# Patient Record
Sex: FEMALE | Race: WHITE | NOT HISPANIC OR LATINO | Employment: OTHER | URBAN - METROPOLITAN AREA
[De-identification: names, ages, dates, MRNs, and addresses within clinical notes are randomized per-mention and may not be internally consistent; named-entity substitution may affect disease eponyms.]

---

## 2021-01-01 ENCOUNTER — HOSPITAL ENCOUNTER (OUTPATIENT)
Dept: RADIOLOGY | Facility: MEDICAL CENTER | Age: 86
End: 2021-01-01
Payer: COMMERCIAL

## 2021-01-01 ENCOUNTER — APPOINTMENT (OUTPATIENT)
Dept: CARDIOLOGY | Facility: MEDICAL CENTER | Age: 86
DRG: 176 | End: 2021-01-01
Attending: EMERGENCY MEDICINE
Payer: COMMERCIAL

## 2021-01-01 ENCOUNTER — HOSPITAL ENCOUNTER (INPATIENT)
Facility: MEDICAL CENTER | Age: 86
LOS: 4 days | DRG: 176 | End: 2021-01-06
Attending: EMERGENCY MEDICINE | Admitting: INTERNAL MEDICINE
Payer: COMMERCIAL

## 2021-01-01 ENCOUNTER — APPOINTMENT (OUTPATIENT)
Dept: RADIOLOGY | Facility: MEDICAL CENTER | Age: 86
DRG: 176 | End: 2021-01-01
Attending: EMERGENCY MEDICINE
Payer: COMMERCIAL

## 2021-01-01 DIAGNOSIS — I26.99 ACUTE PULMONARY EMBOLISM WITHOUT ACUTE COR PULMONALE, UNSPECIFIED PULMONARY EMBOLISM TYPE (HCC): ICD-10-CM

## 2021-01-01 DIAGNOSIS — N18.30 STAGE 3 CHRONIC KIDNEY DISEASE, UNSPECIFIED WHETHER STAGE 3A OR 3B CKD: ICD-10-CM

## 2021-01-01 DIAGNOSIS — K83.1 OBSTRUCTIVE JAUNDICE: ICD-10-CM

## 2021-01-01 PROBLEM — F32.A DEPRESSION: Status: ACTIVE | Noted: 2021-01-01

## 2021-01-01 PROBLEM — I10 HYPERTENSION: Status: ACTIVE | Noted: 2021-01-01

## 2021-01-01 PROBLEM — R74.01 TRANSAMINITIS: Status: ACTIVE | Noted: 2021-01-01

## 2021-01-01 PROBLEM — I27.20 PULMONARY HYPERTENSION (HCC): Status: ACTIVE | Noted: 2021-01-01

## 2021-01-01 PROBLEM — E78.5 HYPERLIPIDEMIA: Status: ACTIVE | Noted: 2021-01-01

## 2021-01-01 PROBLEM — J96.01 ACUTE HYPOXEMIC RESPIRATORY FAILURE (HCC): Status: ACTIVE | Noted: 2021-01-01

## 2021-01-01 LAB
ALBUMIN SERPL BCP-MCNC: 3 G/DL (ref 3.2–4.9)
ALBUMIN/GLOB SERPL: 1 G/DL
ALP SERPL-CCNC: 266 U/L (ref 30–99)
ALT SERPL-CCNC: 202 U/L (ref 2–50)
ANION GAP SERPL CALC-SCNC: 9 MMOL/L (ref 7–16)
APPEARANCE UR: CLEAR
AST SERPL-CCNC: 367 U/L (ref 12–45)
BACTERIA #/AREA URNS HPF: ABNORMAL /HPF
BASOPHILS # BLD AUTO: 0.3 % (ref 0–1.8)
BASOPHILS # BLD: 0.05 K/UL (ref 0–0.12)
BILIRUB SERPL-MCNC: 3 MG/DL (ref 0.1–1.5)
BILIRUB UR QL STRIP.AUTO: NEGATIVE
BUN SERPL-MCNC: 23 MG/DL (ref 8–22)
CALCIUM SERPL-MCNC: 9 MG/DL (ref 8.5–10.5)
CHLORIDE SERPL-SCNC: 101 MMOL/L (ref 96–112)
CO2 SERPL-SCNC: 23 MMOL/L (ref 20–33)
COLOR UR: YELLOW
COVID ORDER STATUS COVID19: NORMAL
CREAT SERPL-MCNC: 1.24 MG/DL (ref 0.5–1.4)
EOSINOPHIL # BLD AUTO: 0.01 K/UL (ref 0–0.51)
EOSINOPHIL NFR BLD: 0.1 % (ref 0–6.9)
EPI CELLS #/AREA URNS HPF: ABNORMAL /HPF
ERYTHROCYTE [DISTWIDTH] IN BLOOD BY AUTOMATED COUNT: 49.6 FL (ref 35.9–50)
FLUAV RNA SPEC QL NAA+PROBE: NEGATIVE
FLUBV RNA SPEC QL NAA+PROBE: NEGATIVE
GLOBULIN SER CALC-MCNC: 2.9 G/DL (ref 1.9–3.5)
GLUCOSE SERPL-MCNC: 127 MG/DL (ref 65–99)
GLUCOSE UR STRIP.AUTO-MCNC: NEGATIVE MG/DL
HCT VFR BLD AUTO: 36.2 % (ref 37–47)
HGB BLD-MCNC: 12.1 G/DL (ref 12–16)
HYALINE CASTS #/AREA URNS LPF: ABNORMAL /LPF
IMM GRANULOCYTES # BLD AUTO: 0.09 K/UL (ref 0–0.11)
IMM GRANULOCYTES NFR BLD AUTO: 0.6 % (ref 0–0.9)
KETONES UR STRIP.AUTO-MCNC: NEGATIVE MG/DL
LEUKOCYTE ESTERASE UR QL STRIP.AUTO: ABNORMAL
LIPASE SERPL-CCNC: 16 U/L (ref 11–82)
LV EJECT FRACT  99904: 65
LV EJECT FRACT MOD 2C 99903: 76.01
LV EJECT FRACT MOD 4C 99902: 54.84
LV EJECT FRACT MOD BP 99901: 67.08
LYMPHOCYTES # BLD AUTO: 0.78 K/UL (ref 1–4.8)
LYMPHOCYTES NFR BLD: 4.8 % (ref 22–41)
MCH RBC QN AUTO: 31.6 PG (ref 27–33)
MCHC RBC AUTO-ENTMCNC: 33.4 G/DL (ref 33.6–35)
MCV RBC AUTO: 94.5 FL (ref 81.4–97.8)
MICRO URNS: ABNORMAL
MONOCYTES # BLD AUTO: 1.06 K/UL (ref 0–0.85)
MONOCYTES NFR BLD AUTO: 6.5 % (ref 0–13.4)
NEUTROPHILS # BLD AUTO: 14.22 K/UL (ref 2–7.15)
NEUTROPHILS NFR BLD: 87.7 % (ref 44–72)
NITRITE UR QL STRIP.AUTO: NEGATIVE
NRBC # BLD AUTO: 0 K/UL
NRBC BLD-RTO: 0 /100 WBC
PH UR STRIP.AUTO: 7 [PH] (ref 5–8)
PLATELET # BLD AUTO: 156 K/UL (ref 164–446)
PMV BLD AUTO: 10.2 FL (ref 9–12.9)
POTASSIUM SERPL-SCNC: 4.1 MMOL/L (ref 3.6–5.5)
PROT SERPL-MCNC: 5.9 G/DL (ref 6–8.2)
PROT UR QL STRIP: 30 MG/DL
RBC # BLD AUTO: 3.83 M/UL (ref 4.2–5.4)
RBC # URNS HPF: ABNORMAL /HPF
RBC UR QL AUTO: ABNORMAL
RSV RNA SPEC QL NAA+PROBE: NEGATIVE
SARS-COV-2 RNA RESP QL NAA+PROBE: NOTDETECTED
SODIUM SERPL-SCNC: 133 MMOL/L (ref 135–145)
SP GR UR STRIP.AUTO: 1.03
SPECIMEN SOURCE: NORMAL
TROPONIN T SERPL-MCNC: 47 NG/L (ref 6–19)
UROBILINOGEN UR STRIP.AUTO-MCNC: 0.2 MG/DL
WBC # BLD AUTO: 16.2 K/UL (ref 4.8–10.8)
WBC #/AREA URNS HPF: ABNORMAL /HPF

## 2021-01-01 PROCEDURE — 93306 TTE W/DOPPLER COMPLETE: CPT | Mod: 26 | Performed by: INTERNAL MEDICINE

## 2021-01-01 PROCEDURE — 85025 COMPLETE CBC W/AUTO DIFF WBC: CPT

## 2021-01-01 PROCEDURE — 36415 COLL VENOUS BLD VENIPUNCTURE: CPT

## 2021-01-01 PROCEDURE — G0378 HOSPITAL OBSERVATION PER HR: HCPCS

## 2021-01-01 PROCEDURE — 99285 EMERGENCY DEPT VISIT HI MDM: CPT

## 2021-01-01 PROCEDURE — 0241U HCHG SARS-COV-2 COVID-19 NFCT DS RESP RNA 4 TRGT MIC: CPT

## 2021-01-01 PROCEDURE — 99218 PR INITIAL OBSERVATION CARE,LEVL I: CPT | Mod: AI | Performed by: HOSPITALIST

## 2021-01-01 PROCEDURE — 80053 COMPREHEN METABOLIC PANEL: CPT

## 2021-01-01 PROCEDURE — 83690 ASSAY OF LIPASE: CPT

## 2021-01-01 PROCEDURE — 76705 ECHO EXAM OF ABDOMEN: CPT

## 2021-01-01 PROCEDURE — 81001 URINALYSIS AUTO W/SCOPE: CPT

## 2021-01-01 PROCEDURE — C9803 HOPD COVID-19 SPEC COLLECT: HCPCS | Performed by: EMERGENCY MEDICINE

## 2021-01-01 PROCEDURE — 87086 URINE CULTURE/COLONY COUNT: CPT

## 2021-01-01 PROCEDURE — 700102 HCHG RX REV CODE 250 W/ 637 OVERRIDE(OP): Performed by: HOSPITALIST

## 2021-01-01 PROCEDURE — 93306 TTE W/DOPPLER COMPLETE: CPT

## 2021-01-01 PROCEDURE — A9270 NON-COVERED ITEM OR SERVICE: HCPCS | Performed by: HOSPITALIST

## 2021-01-01 PROCEDURE — 84484 ASSAY OF TROPONIN QUANT: CPT

## 2021-01-01 RX ORDER — FERROUS SULFATE 325(65) MG
325 TABLET ORAL EVERY MORNING
Status: DISCONTINUED | OUTPATIENT
Start: 2021-01-02 | End: 2021-01-06 | Stop reason: HOSPADM

## 2021-01-01 RX ORDER — LOSARTAN POTASSIUM 50 MG/1
50 TABLET ORAL EVERY EVENING
Status: DISCONTINUED | OUTPATIENT
Start: 2021-01-01 | End: 2021-01-06 | Stop reason: HOSPADM

## 2021-01-01 RX ORDER — FLUOXETINE HYDROCHLORIDE 20 MG/1
20 CAPSULE ORAL EVERY MORNING
Status: DISCONTINUED | OUTPATIENT
Start: 2021-01-01 | End: 2021-01-06 | Stop reason: HOSPADM

## 2021-01-01 RX ORDER — FLUTICASONE PROPIONATE 50 MCG
2 SPRAY, SUSPENSION (ML) NASAL EVERY EVENING
Status: DISCONTINUED | OUTPATIENT
Start: 2021-01-01 | End: 2021-01-06 | Stop reason: HOSPADM

## 2021-01-01 RX ORDER — ONDANSETRON 2 MG/ML
4 INJECTION INTRAMUSCULAR; INTRAVENOUS EVERY 4 HOURS PRN
Status: DISCONTINUED | OUTPATIENT
Start: 2021-01-01 | End: 2021-01-06 | Stop reason: HOSPADM

## 2021-01-01 RX ORDER — ACETAMINOPHEN 325 MG/1
650 TABLET ORAL EVERY 6 HOURS PRN
Status: DISCONTINUED | OUTPATIENT
Start: 2021-01-01 | End: 2021-01-06 | Stop reason: HOSPADM

## 2021-01-01 RX ORDER — LOSARTAN POTASSIUM 50 MG/1
50 TABLET ORAL EVERY EVENING
COMMUNITY

## 2021-01-01 RX ORDER — FERROUS SULFATE 325(65) MG
325 TABLET ORAL EVERY MORNING
COMMUNITY

## 2021-01-01 RX ORDER — POLYETHYLENE GLYCOL 3350 17 G/17G
1 POWDER, FOR SOLUTION ORAL
Status: DISCONTINUED | OUTPATIENT
Start: 2021-01-01 | End: 2021-01-06 | Stop reason: HOSPADM

## 2021-01-01 RX ORDER — ONDANSETRON 4 MG/1
4 TABLET, ORALLY DISINTEGRATING ORAL EVERY 4 HOURS PRN
Status: DISCONTINUED | OUTPATIENT
Start: 2021-01-01 | End: 2021-01-06 | Stop reason: HOSPADM

## 2021-01-01 RX ORDER — AMOXICILLIN 250 MG
2 CAPSULE ORAL 2 TIMES DAILY
Status: DISCONTINUED | OUTPATIENT
Start: 2021-01-01 | End: 2021-01-06 | Stop reason: HOSPADM

## 2021-01-01 RX ORDER — FLUTICASONE PROPIONATE 50 MCG
2 SPRAY, SUSPENSION (ML) NASAL EVERY EVENING
COMMUNITY

## 2021-01-01 RX ORDER — ROSUVASTATIN CALCIUM 20 MG/1
20 TABLET, COATED ORAL EVERY EVENING
Status: DISCONTINUED | OUTPATIENT
Start: 2021-01-01 | End: 2021-01-01

## 2021-01-01 RX ORDER — BISACODYL 10 MG
10 SUPPOSITORY, RECTAL RECTAL
Status: DISCONTINUED | OUTPATIENT
Start: 2021-01-01 | End: 2021-01-06 | Stop reason: HOSPADM

## 2021-01-01 RX ORDER — CLINDAMYCIN PHOSPHATE 10 UG/ML
1 LOTION TOPICAL 2 TIMES DAILY
COMMUNITY

## 2021-01-01 RX ORDER — FLUOXETINE HYDROCHLORIDE 20 MG/1
20 CAPSULE ORAL EVERY MORNING
COMMUNITY

## 2021-01-01 RX ORDER — ACETAMINOPHEN 500 MG
1000 TABLET ORAL
COMMUNITY

## 2021-01-01 RX ORDER — M-VIT,TX,IRON,MINS/CALC/FOLIC 27MG-0.4MG
1 TABLET ORAL EVERY MORNING
Status: DISCONTINUED | OUTPATIENT
Start: 2021-01-02 | End: 2021-01-06 | Stop reason: HOSPADM

## 2021-01-01 RX ORDER — ROSUVASTATIN CALCIUM 20 MG/1
20 TABLET, COATED ORAL EVERY EVENING
COMMUNITY

## 2021-01-01 RX ADMIN — FLUOXETINE 20 MG: 20 CAPSULE ORAL at 18:41

## 2021-01-01 RX ADMIN — FLUTICASONE PROPIONATE 100 MCG: 50 SPRAY, METERED NASAL at 21:30

## 2021-01-01 RX ADMIN — LOSARTAN POTASSIUM 50 MG: 50 TABLET, FILM COATED ORAL at 18:41

## 2021-01-01 ASSESSMENT — ENCOUNTER SYMPTOMS
HEADACHES: 0
BLOOD IN STOOL: 0
LOSS OF CONSCIOUSNESS: 0
PALPITATIONS: 0
COUGH: 0
PSYCHIATRIC NEGATIVE: 1
CHILLS: 0
NERVOUS/ANXIOUS: 0
FOCAL WEAKNESS: 0
NAUSEA: 0
DIZZINESS: 0
FEVER: 0
FALLS: 1
SHORTNESS OF BREATH: 1
SENSORY CHANGE: 0
VOMITING: 0
WEAKNESS: 1
ABDOMINAL PAIN: 0
SPEECH CHANGE: 0
INSOMNIA: 0

## 2021-01-01 ASSESSMENT — PAIN DESCRIPTION - PAIN TYPE: TYPE: ACUTE PAIN

## 2021-01-01 NOTE — ED NOTES
Assumed pt care, report received. Pt resting on gurney in NAD. Daughter at bedside. Pt denies pain, respirations even and unlabored on 1L NC. Mild dyspnea with exertion. Updated to POC, needs met.

## 2021-01-01 NOTE — ED TRIAGE NOTES
BIB EMS to rm 2, tx from [facility]  Chief Complaint   Patient presents with   • Shortness of Breath     Pt was satting in the mid 80s PTA on RA, placed on 2L and is now 96%. Pt denies any CP or SOB, VSS.

## 2021-01-01 NOTE — ED PROVIDER NOTES
ED Provider Note    CHIEF COMPLAINT  Chief Complaint   Patient presents with   • Shortness of Breath       HPI  Jen Ramos is a 89 y.o. female who presents as a transfer for several active issues.  The patient apparently lives in [facility].  Reports she had a may be had a fall and broke some ribs few weeks ago.  Family checked on her noted that she was more confused than usual.  She does have a history of dementia but is otherwise quite healthy.  At [facility] they performed an extensive work-up.  CT scan of the head was negative.  CT scan of the chest and abdomen demonstrated gallstones and apparently bilateral pulmonary emboli.  She also had leukocytosis with possible UTI.  Blood cultures were performed and she was given IV Zosyn and first dose of Lovenox.  The patient does report some shortness of breath.  No report of fevers or chills    REVIEW OF SYSTEMS  See HPI for further details.  No night sweats weight loss numbness tingling weakness rash all other systems are negative.     PAST MEDICAL HISTORY  Past Medical History:   Diagnosis Date   • High cholesterol    • Hypertension        FAMILY HISTORY  Noncontributory    SOCIAL HISTORY  Social History     Socioeconomic History   • Marital status: Not on file     Spouse name: Not on file   • Number of children: Not on file   • Years of education: Not on file   • Highest education level: Not on file   Occupational History   • Not on file   Social Needs   • Financial resource strain: Not on file   • Food insecurity     Worry: Not on file     Inability: Not on file   • Transportation needs     Medical: Not on file     Non-medical: Not on file   Tobacco Use   • Smoking status: Never Smoker   • Smokeless tobacco: Never Used   Substance and Sexual Activity   • Alcohol use: Yes     Comment: occasionally   • Drug use: Never   • Sexual activity: Not on file   Lifestyle   • Physical activity     Days per week: Not on file     Minutes per  "session: Not on file   • Stress: Not on file   Relationships   • Social connections     Talks on phone: Not on file     Gets together: Not on file     Attends Mandaeism service: Not on file     Active member of club or organization: Not on file     Attends meetings of clubs or organizations: Not on file     Relationship status: Not on file   • Intimate partner violence     Fear of current or ex partner: Not on file     Emotionally abused: Not on file     Physically abused: Not on file     Forced sexual activity: Not on file   Other Topics Concern   • Not on file   Social History Narrative   • Not on file       SURGICAL HISTORY  No past surgical history on file.    CURRENT MEDICATIONS  Home Medications    **Home medications have not yet been reviewed for this encounter**         ALLERGIES  No Known Allergies    PHYSICAL EXAM  VITAL SIGNS: /66   Pulse 66   Temp 36.7 °C (98 °F) (Temporal)   Resp 20   Ht 1.651 m (5' 5\")   Wt 61.2 kg (135 lb)   SpO2 98%   BMI 22.47 kg/m²       Constitutional: Elderly ill-appearing  HENT: Normocephalic, Atraumatic, Bilateral external ears normal, Oropharynx moist, No oral exudates, Nose normal.   Eyes: PERRLA, EOMI, Conjunctiva normal, No discharge.   Neck: Normal range of motion, No tenderness, Supple, No stridor.   Cardiovascular: Normal heart rate, Normal rhythm, No murmurs, No rubs, No gallops.   Thorax & Lungs: Normal breath sounds, No respiratory distress, No wheezing, No chest tenderness.   Abdomen: Bowel sounds normal, Soft, minimal epigastric discomfort no rebound or guarding  Skin: Warm, Dry, No erythema, No rash.   Back: No tenderness, No CVA tenderness.   Extremities: Intact distal pulses, No edema, No tenderness, No cyanosis, No clubbing.   Neurologic: Alert & oriented x 3, Normal motor function, Normal sensory function, No focal deficits noted.   Psychiatric: Anxious.     RADIOLOGY/PROCEDURES  CT scan of the head from outside facility is unremarkable  CT scan of " the chest abdomen pelvis with contrast demonstrates large asymmetric pulmonary emboli with prominence of the right ventricle suggesting right heart strain.  Interstitial scarring without pulmonary infarct.  No pneumothorax.  US-RUQ   Final Result         1. Cholelithiasis. No evidence of cholecystitis.   2. Mildly dilated CBD for patient's age. If there is clinical concern for choledocholithiasis, consider MRCP.         EC-ECHOCARDIOGRAM COMPLETE W/O CONT   Final Result      OUTSIDE IMAGES-CT CHEST/ABDOMEN/PELVIS   Final Result      OUTSIDE IMAGES-CT HEAD   Final Result        Results for orders placed or performed during the hospital encounter of 01/01/21   EC-ECHOCARDIOGRAM COMPLETE W/O CONT   Result Value Ref Range    Eject.Frac. MOD BP 67.08     Eject.Frac. MOD 4C 54.84     Eject.Frac. MOD 2C 76.01     Left Ventrical Ejection Fraction 65    CBC WITH DIFFERENTIAL   Result Value Ref Range    WBC 16.2 (H) 4.8 - 10.8 K/uL    RBC 3.83 (L) 4.20 - 5.40 M/uL    Hemoglobin 12.1 12.0 - 16.0 g/dL    Hematocrit 36.2 (L) 37.0 - 47.0 %    MCV 94.5 81.4 - 97.8 fL    MCH 31.6 27.0 - 33.0 pg    MCHC 33.4 (L) 33.6 - 35.0 g/dL    RDW 49.6 35.9 - 50.0 fL    Platelet Count 156 (L) 164 - 446 K/uL    MPV 10.2 9.0 - 12.9 fL    Neutrophils-Polys 87.70 (H) 44.00 - 72.00 %    Lymphocytes 4.80 (L) 22.00 - 41.00 %    Monocytes 6.50 0.00 - 13.40 %    Eosinophils 0.10 0.00 - 6.90 %    Basophils 0.30 0.00 - 1.80 %    Immature Granulocytes 0.60 0.00 - 0.90 %    Nucleated RBC 0.00 /100 WBC    Neutrophils (Absolute) 14.22 (H) 2.00 - 7.15 K/uL    Lymphs (Absolute) 0.78 (L) 1.00 - 4.80 K/uL    Monos (Absolute) 1.06 (H) 0.00 - 0.85 K/uL    Eos (Absolute) 0.01 0.00 - 0.51 K/uL    Baso (Absolute) 0.05 0.00 - 0.12 K/uL    Immature Granulocytes (abs) 0.09 0.00 - 0.11 K/uL    NRBC (Absolute) 0.00 K/uL   Comp Metabolic Panel   Result Value Ref Range    Sodium 133 (L) 135 - 145 mmol/L    Potassium 4.1 3.6 - 5.5 mmol/L    Chloride 101 96 - 112 mmol/L    Co2  23 20 - 33 mmol/L    Anion Gap 9.0 7.0 - 16.0    Glucose 127 (H) 65 - 99 mg/dL    Bun 23 (H) 8 - 22 mg/dL    Creatinine 1.24 0.50 - 1.40 mg/dL    Calcium 9.0 8.5 - 10.5 mg/dL    AST(SGOT) 367 (H) 12 - 45 U/L    ALT(SGPT) 202 (H) 2 - 50 U/L    Alkaline Phosphatase 266 (H) 30 - 99 U/L    Total Bilirubin 3.0 (H) 0.1 - 1.5 mg/dL    Albumin 3.0 (L) 3.2 - 4.9 g/dL    Total Protein 5.9 (L) 6.0 - 8.2 g/dL    Globulin 2.9 1.9 - 3.5 g/dL    A-G Ratio 1.0 g/dL   LIPASE   Result Value Ref Range    Lipase 16 11 - 82 U/L   TROPONIN   Result Value Ref Range    Troponin T 47 (H) 6 - 19 ng/L   COVID/SARS CoV-2 PCR    Specimen: Nasopharyngeal; Respirate   Result Value Ref Range    COVID Order Status Received    ESTIMATED GFR   Result Value Ref Range    GFR If  49 (A) >60 mL/min/1.73 m 2    GFR If Non African American 40 (A) >60 mL/min/1.73 m 2   CoV-2, Flu A/B, And RSV by PCR   Result Value Ref Range    Influenza virus A RNA Negative Negative    Influenza virus B, PCR Negative Negative    RSV, PCR Negative Negative    SARS-CoV-2 by PCR NotDetected     SARS-CoV-2 Source NP Swab       COURSE & MEDICAL DECISION MAKING  Pertinent Labs & Imaging studies reviewed. (See chart for details)  Patient presents here as a transfer for multiple issues including bilateral pulmonary emboli.  She has no tachycardia or hypotension to suggest need for thrombolytics.  There is suggestion of right heart strain on CT scan therefore echocardiogram has been performed.  Admitting hospitalist will follow up on this.  She already received blood cultures, first dose of IV Zosyn as well as first dose of Lovenox.  It is unlikely that she is a candidate for directed thrombolysis.  Patient appears clinically well given her age and active issues.  She will be admitted to internal medicine    FINAL IMPRESSION  1.  Bilateral pulmonary emboli  2.  Pneumonitis with possible choledocholithiasis    Admission         Electronically signed by: Pankaj AVILES  YU Minor, 1/1/2021 1:59 PM

## 2021-01-02 ENCOUNTER — APPOINTMENT (OUTPATIENT)
Dept: RADIOLOGY | Facility: MEDICAL CENTER | Age: 86
DRG: 176 | End: 2021-01-02
Attending: HOSPITALIST
Payer: COMMERCIAL

## 2021-01-02 PROBLEM — N30.00 ACUTE CYSTITIS WITHOUT HEMATURIA: Status: ACTIVE | Noted: 2021-01-02

## 2021-01-02 PROBLEM — K83.1 OBSTRUCTIVE JAUNDICE: Status: ACTIVE | Noted: 2021-01-01

## 2021-01-02 LAB
ALBUMIN SERPL BCP-MCNC: 2.9 G/DL (ref 3.2–4.9)
ALBUMIN/GLOB SERPL: 1 G/DL
ALP SERPL-CCNC: 307 U/L (ref 30–99)
ALT SERPL-CCNC: 189 U/L (ref 2–50)
ANION GAP SERPL CALC-SCNC: 9 MMOL/L (ref 7–16)
APTT PPP: 40.4 SEC (ref 24.7–36)
AST SERPL-CCNC: 259 U/L (ref 12–45)
BASOPHILS # BLD AUTO: 0.3 % (ref 0–1.8)
BASOPHILS # BLD: 0.03 K/UL (ref 0–0.12)
BILIRUB SERPL-MCNC: 3.4 MG/DL (ref 0.1–1.5)
BUN SERPL-MCNC: 24 MG/DL (ref 8–22)
CALCIUM SERPL-MCNC: 8.8 MG/DL (ref 8.5–10.5)
CHLORIDE SERPL-SCNC: 102 MMOL/L (ref 96–112)
CO2 SERPL-SCNC: 24 MMOL/L (ref 20–33)
CREAT SERPL-MCNC: 1.16 MG/DL (ref 0.5–1.4)
EOSINOPHIL # BLD AUTO: 0.14 K/UL (ref 0–0.51)
EOSINOPHIL NFR BLD: 1.4 % (ref 0–6.9)
ERYTHROCYTE [DISTWIDTH] IN BLOOD BY AUTOMATED COUNT: 49.4 FL (ref 35.9–50)
GLOBULIN SER CALC-MCNC: 3 G/DL (ref 1.9–3.5)
GLUCOSE SERPL-MCNC: 109 MG/DL (ref 65–99)
HCT VFR BLD AUTO: 34.2 % (ref 37–47)
HGB BLD-MCNC: 11.2 G/DL (ref 12–16)
IMM GRANULOCYTES # BLD AUTO: 0.05 K/UL (ref 0–0.11)
IMM GRANULOCYTES NFR BLD AUTO: 0.5 % (ref 0–0.9)
INR PPP: 1.16 (ref 0.87–1.13)
LYMPHOCYTES # BLD AUTO: 0.92 K/UL (ref 1–4.8)
LYMPHOCYTES NFR BLD: 9.1 % (ref 22–41)
MCH RBC QN AUTO: 31.2 PG (ref 27–33)
MCHC RBC AUTO-ENTMCNC: 32.7 G/DL (ref 33.6–35)
MCV RBC AUTO: 95.3 FL (ref 81.4–97.8)
MONOCYTES # BLD AUTO: 1.11 K/UL (ref 0–0.85)
MONOCYTES NFR BLD AUTO: 11 % (ref 0–13.4)
NEUTROPHILS # BLD AUTO: 7.87 K/UL (ref 2–7.15)
NEUTROPHILS NFR BLD: 77.7 % (ref 44–72)
NRBC # BLD AUTO: 0 K/UL
NRBC BLD-RTO: 0 /100 WBC
PLATELET # BLD AUTO: 143 K/UL (ref 164–446)
PMV BLD AUTO: 10.3 FL (ref 9–12.9)
POTASSIUM SERPL-SCNC: 3.7 MMOL/L (ref 3.6–5.5)
PROT SERPL-MCNC: 5.9 G/DL (ref 6–8.2)
PROTHROMBIN TIME: 15.2 SEC (ref 12–14.6)
RBC # BLD AUTO: 3.59 M/UL (ref 4.2–5.4)
SODIUM SERPL-SCNC: 135 MMOL/L (ref 135–145)
UFH PPP CHRO-ACNC: 0.1 IU/ML
WBC # BLD AUTO: 10.1 K/UL (ref 4.8–10.8)

## 2021-01-02 PROCEDURE — 700105 HCHG RX REV CODE 258: Performed by: INTERNAL MEDICINE

## 2021-01-02 PROCEDURE — 85520 HEPARIN ASSAY: CPT

## 2021-01-02 PROCEDURE — 85025 COMPLETE CBC W/AUTO DIFF WBC: CPT

## 2021-01-02 PROCEDURE — 85730 THROMBOPLASTIN TIME PARTIAL: CPT

## 2021-01-02 PROCEDURE — A9270 NON-COVERED ITEM OR SERVICE: HCPCS | Performed by: HOSPITALIST

## 2021-01-02 PROCEDURE — 74181 MRI ABDOMEN W/O CONTRAST: CPT

## 2021-01-02 PROCEDURE — 770006 HCHG ROOM/CARE - MED/SURG/GYN SEMI*

## 2021-01-02 PROCEDURE — 700102 HCHG RX REV CODE 250 W/ 637 OVERRIDE(OP): Performed by: HOSPITALIST

## 2021-01-02 PROCEDURE — 700111 HCHG RX REV CODE 636 W/ 250 OVERRIDE (IP): Performed by: INTERNAL MEDICINE

## 2021-01-02 PROCEDURE — 93970 EXTREMITY STUDY: CPT

## 2021-01-02 PROCEDURE — 700111 HCHG RX REV CODE 636 W/ 250 OVERRIDE (IP): Performed by: HOSPITALIST

## 2021-01-02 PROCEDURE — 96365 THER/PROPH/DIAG IV INF INIT: CPT

## 2021-01-02 PROCEDURE — 80053 COMPREHEN METABOLIC PANEL: CPT

## 2021-01-02 PROCEDURE — 85610 PROTHROMBIN TIME: CPT

## 2021-01-02 PROCEDURE — 96372 THER/PROPH/DIAG INJ SC/IM: CPT

## 2021-01-02 PROCEDURE — 36415 COLL VENOUS BLD VENIPUNCTURE: CPT

## 2021-01-02 PROCEDURE — 99233 SBSQ HOSP IP/OBS HIGH 50: CPT | Performed by: INTERNAL MEDICINE

## 2021-01-02 PROCEDURE — 94760 N-INVAS EAR/PLS OXIMETRY 1: CPT

## 2021-01-02 RX ORDER — HEPARIN SODIUM 1000 [USP'U]/ML
40 INJECTION, SOLUTION INTRAVENOUS; SUBCUTANEOUS PRN
Status: DISPENSED | OUTPATIENT
Start: 2021-01-02 | End: 2021-01-05

## 2021-01-02 RX ORDER — HEPARIN SODIUM 5000 [USP'U]/100ML
0-30 INJECTION, SOLUTION INTRAVENOUS CONTINUOUS
Status: DISPENSED | OUTPATIENT
Start: 2021-01-02 | End: 2021-01-05

## 2021-01-02 RX ADMIN — HEPARIN SODIUM 18 UNITS/KG/HR: 5000 INJECTION, SOLUTION INTRAVENOUS at 18:04

## 2021-01-02 RX ADMIN — FLUOXETINE 20 MG: 20 CAPSULE ORAL at 05:40

## 2021-01-02 RX ADMIN — MULTIPLE VITAMINS W/ MINERALS TAB 1 TABLET: TAB at 05:40

## 2021-01-02 RX ADMIN — FERROUS SULFATE TAB 325 MG (65 MG ELEMENTAL FE) 325 MG: 325 (65 FE) TAB at 05:40

## 2021-01-02 RX ADMIN — FLUTICASONE PROPIONATE 100 MCG: 50 SPRAY, METERED NASAL at 17:41

## 2021-01-02 RX ADMIN — CEFTRIAXONE SODIUM 1 G: 1 INJECTION, POWDER, FOR SOLUTION INTRAMUSCULAR; INTRAVENOUS at 11:14

## 2021-01-02 RX ADMIN — ENOXAPARIN SODIUM 60 MG: 60 INJECTION SUBCUTANEOUS at 11:15

## 2021-01-02 RX ADMIN — DOCUSATE SODIUM 50 MG AND SENNOSIDES 8.6 MG 2 TABLET: 8.6; 5 TABLET, FILM COATED ORAL at 05:40

## 2021-01-02 RX ADMIN — LOSARTAN POTASSIUM 50 MG: 50 TABLET, FILM COATED ORAL at 17:41

## 2021-01-02 ASSESSMENT — ENCOUNTER SYMPTOMS
COUGH: 0
SHORTNESS OF BREATH: 0
DIARRHEA: 0
FEVER: 0
SPUTUM PRODUCTION: 0
HEMOPTYSIS: 0
WHEEZING: 0
BLOOD IN STOOL: 0
ABDOMINAL PAIN: 0
CHILLS: 0

## 2021-01-02 ASSESSMENT — COGNITIVE AND FUNCTIONAL STATUS - GENERAL
CLIMB 3 TO 5 STEPS WITH RAILING: A LITTLE
WALKING IN HOSPITAL ROOM: A LITTLE
SUGGESTED CMS G CODE MODIFIER MOBILITY: CK
HELP NEEDED FOR BATHING: A LITTLE
WALKING IN HOSPITAL ROOM: A LITTLE
SUGGESTED CMS G CODE MODIFIER DAILY ACTIVITY: CK
MOVING FROM LYING ON BACK TO SITTING ON SIDE OF FLAT BED: A LITTLE
EATING MEALS: A LITTLE
TOILETING: A LITTLE
SUGGESTED CMS G CODE MODIFIER DAILY ACTIVITY: CJ
DRESSING REGULAR LOWER BODY CLOTHING: A LITTLE
MOVING FROM LYING ON BACK TO SITTING ON SIDE OF FLAT BED: A LITTLE
SUGGESTED CMS G CODE MODIFIER MOBILITY: CK
MOVING TO AND FROM BED TO CHAIR: A LITTLE
DRESSING REGULAR UPPER BODY CLOTHING: A LITTLE
MOBILITY SCORE: 18
CLIMB 3 TO 5 STEPS WITH RAILING: A LITTLE
STANDING UP FROM CHAIR USING ARMS: A LITTLE
TURNING FROM BACK TO SIDE WHILE IN FLAT BAD: A LITTLE
HELP NEEDED FOR BATHING: A LITTLE
PERSONAL GROOMING: A LITTLE
MOBILITY SCORE: 19
MOVING TO AND FROM BED TO CHAIR: A LITTLE
DAILY ACTIVITIY SCORE: 18
DAILY ACTIVITIY SCORE: 20
DRESSING REGULAR LOWER BODY CLOTHING: A LITTLE
STANDING UP FROM CHAIR USING ARMS: A LITTLE
TOILETING: A LITTLE
DRESSING REGULAR UPPER BODY CLOTHING: A LITTLE

## 2021-01-02 ASSESSMENT — LIFESTYLE VARIABLES
CONSUMPTION TOTAL: INCOMPLETE
ALCOHOL_USE: YES
HAVE PEOPLE ANNOYED YOU BY CRITICIZING YOUR DRINKING: NO
TOTAL SCORE: 0
TOTAL SCORE: 0
HAVE YOU EVER FELT YOU SHOULD CUT DOWN ON YOUR DRINKING: NO
EVER HAD A DRINK FIRST THING IN THE MORNING TO STEADY YOUR NERVES TO GET RID OF A HANGOVER: NO
HAVE YOU EVER FELT YOU SHOULD CUT DOWN ON YOUR DRINKING: NO
DOES PATIENT WANT TO STOP DRINKING: NO
TOTAL SCORE: 0
EVER HAD A DRINK FIRST THING IN THE MORNING TO STEADY YOUR NERVES TO GET RID OF A HANGOVER: NO
EVER HAD A DRINK FIRST THING IN THE MORNING TO STEADY YOUR NERVES TO GET RID OF A HANGOVER: NO
EVER FELT BAD OR GUILTY ABOUT YOUR DRINKING: NO
EVER FELT BAD OR GUILTY ABOUT YOUR DRINKING: NO
CONSUMPTION TOTAL: INCOMPLETE
AVERAGE NUMBER OF DAYS PER WEEK YOU HAVE A DRINK CONTAINING ALCOHOL: -2
AVERAGE NUMBER OF DAYS PER WEEK YOU HAVE A DRINK CONTAINING ALCOHOL: 2
ALCOHOL_USE: YES
HOW MANY TIMES IN THE PAST YEAR HAVE YOU HAD 5 OR MORE DRINKS IN A DAY: 0
ON A TYPICAL DAY WHEN YOU DRINK ALCOHOL HOW MANY DRINKS DO YOU HAVE: 1
HAVE PEOPLE ANNOYED YOU BY CRITICIZING YOUR DRINKING: NO
EVER FELT BAD OR GUILTY ABOUT YOUR DRINKING: NO
HAVE PEOPLE ANNOYED YOU BY CRITICIZING YOUR DRINKING: NO
AVERAGE NUMBER OF DAYS PER WEEK YOU HAVE A DRINK CONTAINING ALCOHOL: -2
TOTAL SCORE: 0
CONSUMPTION TOTAL: NEGATIVE
TOTAL SCORE: 0
AVERAGE NUMBER OF DAYS PER WEEK YOU HAVE A DRINK CONTAINING ALCOHOL: -2
TOTAL SCORE: 0
ALCOHOL_USE: YES
TOTAL SCORE: 0
TOTAL SCORE: 0
EVER FELT BAD OR GUILTY ABOUT YOUR DRINKING: NO
DOES PATIENT WANT TO STOP DRINKING: NO
TOTAL SCORE: 0
ON A TYPICAL DAY WHEN YOU DRINK ALCOHOL HOW MANY DRINKS DO YOU HAVE: 1
TOTAL SCORE: 0
ON A TYPICAL DAY WHEN YOU DRINK ALCOHOL HOW MANY DRINKS DO YOU HAVE: 1
CONSUMPTION TOTAL: INCOMPLETE
ON A TYPICAL DAY WHEN YOU DRINK ALCOHOL HOW MANY DRINKS DO YOU HAVE: -1
HAVE PEOPLE ANNOYED YOU BY CRITICIZING YOUR DRINKING: NO
EVER HAD A DRINK FIRST THING IN THE MORNING TO STEADY YOUR NERVES TO GET RID OF A HANGOVER: NO
TOTAL SCORE: 0
EVER HAD A DRINK FIRST THING IN THE MORNING TO STEADY YOUR NERVES TO GET RID OF A HANGOVER: NO
HAVE YOU EVER FELT YOU SHOULD CUT DOWN ON YOUR DRINKING: NO
HAVE YOU EVER FELT YOU SHOULD CUT DOWN ON YOUR DRINKING: NO
DOES PATIENT WANT TO STOP DRINKING: NO
ON A TYPICAL DAY WHEN YOU DRINK ALCOHOL HOW MANY DRINKS DO YOU HAVE: -1
HAVE YOU EVER FELT YOU SHOULD CUT DOWN ON YOUR DRINKING: NO
EVER FELT BAD OR GUILTY ABOUT YOUR DRINKING: NO
ALCOHOL_USE: YES
AVERAGE NUMBER OF DAYS PER WEEK YOU HAVE A DRINK CONTAINING ALCOHOL: 3
TOTAL SCORE: 0
CONSUMPTION TOTAL: INCOMPLETE
TOTAL SCORE: 0
ALCOHOL_USE: YES
HAVE PEOPLE ANNOYED YOU BY CRITICIZING YOUR DRINKING: NO
DOES PATIENT WANT TO STOP DRINKING: NO

## 2021-01-02 ASSESSMENT — PATIENT HEALTH QUESTIONNAIRE - PHQ9
SUM OF ALL RESPONSES TO PHQ9 QUESTIONS 1 AND 2: 0
2. FEELING DOWN, DEPRESSED, IRRITABLE, OR HOPELESS: NOT AT ALL
1. LITTLE INTEREST OR PLEASURE IN DOING THINGS: NOT AT ALL

## 2021-01-02 ASSESSMENT — FIBROSIS 4 INDEX: FIB4 SCORE: 12.38

## 2021-01-02 NOTE — ASSESSMENT & PLAN NOTE
Echo with grade I diastolic dysfunction and elevated RVSP at 66mmHg. Increased pressures 2/2 PE.  - management as above  - outpatient follow up

## 2021-01-02 NOTE — PROGRESS NOTES
2 RN skin check complete with Lin HALE.   Devices in place PIV.  Skin assessed under devices yes.  Confirmed pressure ulcers found on n/a.  New potential pressure ulcers noted on no. Wound consult placed no.    The following interventions in place pt is able to turn self and encouraged to do so. Pressure redistribution mattress.     Skin Assessment:  Bilateral ears: Red/pink blanching, grey foam pad applied  Elbows, heels, and Sacrum: Intact/ pink/ blanching.

## 2021-01-02 NOTE — PROGRESS NOTES
HOSPITAL MEDICINE PROGRESS NOTE    Date of service  1/2/2021    Chief Complaint  Shortness of Breath      Hospital Course:     89-year-old woman who presented on transfer from an outside hospital for acute PE.           Interval History Updates:  No event overnight.  Afebrile.    I discussed the history with her daughter over the phone.  Patient has a fall on October 24, 2020 and subsequently was not as active as he used to be.  She was visiting her daughter near [place] for the holiday and suffered another fall surrounding the after [place].,  Most likely this represents an event where a DVT clot travel to her lung causing a PE.  Subsequently, the patient become more lethargic, more hypoxic and ultimately ended up in the ER at [facility] in [place] due to hypoxia and shortness of breath, was diagnosed with an acute PE at that time before transferring here for higher level of care.    I anticipate that the patient will need to remain hospitalized for at least an additional 2 to 3 days for additional work-up and treatment of her DVT/PE as well as obstructive jaundice.  Therefore, patient admitted to inpatient status.    Consultants/Specialty  None    Review of Systems   Review of Systems   Constitutional: Negative for chills and fever.   Respiratory: Negative for cough, hemoptysis, sputum production, shortness of breath and wheezing.    Cardiovascular: Negative for chest pain.   Gastrointestinal: Negative for abdominal pain, blood in stool and diarrhea.   Skin: Negative for itching and rash.        Medications:  • cefTRIAXone (ROCEPHIN) IV  1 g Q24HRS   • heparin  0-30 Units/kg/hr Continuous   • heparin  40 Units/kg PRN   • senna-docusate  2 Tab BID    And   • polyethylene glycol/lytes  1 Packet QDAY PRN    And   • magnesium hydroxide  30 mL QDAY PRN    And   • bisacodyl  10 mg QDAY PRN   • Respiratory Therapy Consult   Continuous RT   • acetaminophen  650 mg Q6HRS PRN   • ondansetron  4 mg Q4HRS PRN   • ondansetron   4 mg Q4HRS PRN   • ferrous sulfate  325 mg QAM   • FLUoxetine  20 mg QAM   • fluticasone  2 Spray Q EVENING   • therapeutic multivitamin-minerals  1 Tab QAM   • losartan  50 mg Q EVENING           Physical Exam   Vitals:    01/01/21 1823 01/01/21 1930 01/02/21 0400 01/02/21 0750   BP: 129/67 112/60 119/73 129/59   Pulse: 68 71 75 80   Resp: 20 20 18 16   Temp: 35.8 °C (96.5 °F) 36 °C (96.8 °F) 36.4 °C (97.6 °F) 36.7 °C (98.1 °F)   TempSrc: Temporal Tympanic Temporal Temporal   SpO2:  97% 97% 95%   Weight:       Height:           Physical Exam   Constitutional: She is oriented to person, place, and time and well-developed, well-nourished, and in no distress. No distress.   HENT:   Head: Normocephalic and atraumatic.   Eyes: Scleral icterus is present.   Cardiovascular: Normal rate and regular rhythm. Exam reveals no gallop.   Pulmonary/Chest: Effort normal and breath sounds normal.   Abdominal: Soft. Bowel sounds are normal. She exhibits distension.   Positive for mild tenderness at the right upper quadrant but no rebound or guarding.   Neurological: She is alert and oriented to person, place, and time.   Skin: Skin is warm and dry. She is not diaphoretic.   Psychiatric: Mood and affect normal.   Vitals reviewed.         Laboratory   Recent Labs     01/01/21  1423 01/02/21  0152   WBC 16.2* 10.1   RBC 3.83* 3.59*   HEMOGLOBIN 12.1 11.2*   HEMATOCRIT 36.2* 34.2*   PLATELETCT 156* 143*     Recent Labs     01/01/21  1423 01/02/21  0152   SODIUM 133* 135   POTASSIUM 4.1 3.7   CHLORIDE 101 102   CO2 23 24   GLUCOSE 127* 109*   BUN 23* 24*   CREATININE 1.24 1.16      Recent Labs     01/01/21  1423 01/02/21  0152   ALTSGPT 202* 189*   ASTSGOT 367* 259*   ALKPHOSPHAT 266* 307*   TBILIRUBIN 3.0* 3.4*   LIPASE 16  --    GLUCOSE 127* 109*                Microbiology  Results     Procedure Component Value Units Date/Time    URINALYSIS [[id number]]  (Abnormal) Collected: 01/01/21 8295    Order Status: Completed Specimen: Urine  "Updated: 01/01/21 1914     Color Yellow     Character Clear     Specific Gravity 1.027     Ph 7.0     Glucose Negative mg/dL      Ketones Negative mg/dL      Protein 30 mg/dL      Bilirubin Negative     Urobilinogen, Urine 0.2     Nitrite Negative     Leukocyte Esterase Moderate     Occult Blood Moderate     Micro Urine Req Microscopic    Narrative:      Droplet, Contact, and Eye Protection    CoV-2, Flu A/B, And RSV by PCR [[id number]] Collected: 01/01/21 1453    Order Status: Completed Updated: 01/01/21 1556     Influenza virus A RNA Negative     Influenza virus B, PCR Negative     RSV, PCR Negative     SARS-CoV-2 by PCR NotDetected     Comment: PATIENTS: Important information regarding your results and instructions can  be found at [email or web address]   \"After your  Covid-19 Test\"  [facility] providers: PLEASE REFER TO DE-ESCALATION AND RETESTING PROTOCOL  on [email or web address]  **The [employer] GeneXpert Xpress SARS-CoV-2 Test has been made available for  use under the Emergency Use Authorization (EUA) only.          SARS-CoV-2 Source NP Swab    Narrative:      Droplet, Contact, and Eye Protection  Rule-out COVID-19 panel, includes droplet/contact/eye  isolation order.  Check influenza to order this test only if  specifically indicated.  Is patient being admitted?->Yes  Does this patient meet criteria for Rush/Cepheid per [facility]  Inpatient Workflow? (See workflow link below)->Yes  Expected turn around time?->Rush (Cepheid 2-4 hours)  Have you been in close contact with a person who is suspected  or known to be positive for COVID-19 within the last 30 days  (e.g. last seen that person < 30 days ago)->Unknown    COVID/SARS CoV-2 PCR [[id number]] Collected: 01/01/21 1453    Order Status: Completed Specimen: Respirate from Nasopharyngeal Updated: 01/01/21 1508     COVID Order Status Received     Comment: The order for SARS CoV-2 testing has been received by the  Laboratory. This result is neither " positive nor negative.  Final results of testing will report in 24-48 hours, separately.         Narrative:      Droplet, Contact, and Eye Protection  Rule-out COVID-19 panel, includes droplet/contact/eye  isolation order.  Check influenza to order this test only if  specifically indicated.  Is patient being admitted?->Yes  Does this patient meet criteria for Rush/Cepheid per [facility]  Inpatient Workflow? (See workflow link below)->Yes  Expected turn around time?->Rush (Cepheid 2-4 hours)  Have you been in close contact with a person who is suspected  or known to be positive for COVID-19 within the last 30 days  (e.g. last seen that person < 30 days ago)->Unknown             Labs reviewed by me and noted above.    Radiology  US-EXTREMITY VENOUS LOWER BILAT   Vascular Laboratory   CONCLUSIONS     CARTER PATRICK     Exam Date:     2021 08:39     Room #:     Inpatient     Priority:     Routine     Ht (in):             Wt (lb):     Ordering Physician:        FORTUNATO SÁNCHEZ     Referring Physician:       [id number] Serrano     Sonographer:               Branden Deras RVT     Study Type:                Complete Bilateral     Technical Quality:         Adequate     Age:    89    Gender:     F     MRN:    [id number]     :    [id number]      BSA:     Indications:     Pulmonary Embolus, Shortness of breath     CPT Codes:       16710     ICD Codes:       415.19  R06.02     History:         shortness of breath; no prior exams     Limitations:     PROCEDURES:   Venous duplex imaging was performed in both lower extremities including    serial compressions and spectral Doppler flow evaluation.     FINDINGS:   RIGHT:   Incompressible common femoral vein & proximal femoral vein (1 of 2) with    mixed echogenic material occluding the lumen consistent with subacute deep    vein thrombosis.  Flow in the profunda femoral vein is reversed.     Continuous flow is observed in the 2nd patent femoral vein.   Partial incompressibility of  the popliteal & a peroneal vein in the calf    with chronic appearing strand of thrombus partially filling the lumen in    the popliteal; the peroneal vein thrombus may be subacute.   The posterior tibial veins are fully compressible.   LEFT:   Chronic residua of prior thrombus observed in the popliteal vein which is    non-occlusive.   All other veins appear fully compressible where visualized with normal    venous flow demonstrated.   No evidence of acute deep vein thrombosis.      No results found for this or any previous visit.       Assessment and Plan      * Acute pulmonary embolism (HCC)- (present on admission)  Assessment & Plan  With hypoxia which is expected.  However, patient remained hemodynamically stable without any subjective chest pain and only required 1 L of supplemental oxygen via nasal cannula.  By history, most likely the second ground-level fall event post [place] is probably when her DVT (right lower extremity ultrasound preliminary show subacute DVT of the right femoral vein) traveled to her lungs because of this PE.  The ultrasound of her lower extremity show a subacute clot in the right lower extremity.  Given her renal function, we will switch her from Lovenox to heparin drip.  Discussed with the inpatient pharmacist.  We will aim to start the heparin drip and 6 hours after the last administration of Lovenox subcutaneous dose.    Obstructive jaundice- (present on admission)  Assessment & Plan  She is jaundiced.  Right upper quadrant ultrasound positive for cholelithiasis.  Mildly tender right upper quadrant abdominal exam.  - MRCP pending  Ceftriaxone started for urinary tract infection will give some coverage for this particular issue as well.  More than likely, she will need cholecystectomy during this admission.  It would be nice if her clot burden in her pulmonary vasculature is resolved.    Acute cystitis without hematuria- (present on admission)  Assessment & Plan  Positive  urinalysis.  We will send existing urine for culture.  Empiric treatment with ceftriaxone.    Hypertension- (present on admission)  Assessment & Plan  Normotensive.   - continue home losartan    Depression- (present on admission)  Assessment & Plan  Stable at this time.   - continue home fluoxetine    Pulmonary hypertension (HCC)- (present on admission)  Assessment & Plan  Echo with grade I diastolic dysfunction and elevated RVSP at 66mmHg. Increased pressures 2/2 PE.  - management as above  - outpatient follow up    Stage 3 chronic kidney disease- (present on admission)  Assessment & Plan  At baseline.   - avoid nephrotoxic agents  - renally dose medications    Hyperlipidemia  Assessment & Plan  - holding rosuvastatin 2/2 transaminitis      DVT prophylaxis: Lovenox    CODE STATUS:  FULL CODE    Disposition: TBD.

## 2021-01-02 NOTE — PROGRESS NOTES
Assumed care of pt at shift change. Pt is on 1L of oxygen via NC with no signs of acute distress. A&Ox4. Denies any pain. POC discussed with patient. All comfort measures in place. Call light and personal belongings by bedside. Bed locked and in lowest position. Hourly rounding in place.

## 2021-01-02 NOTE — ASSESSMENT & PLAN NOTE
With hypoxia which is expected.  However, patient remained hemodynamically stable without any subjective chest pain and only required 1 L of supplemental oxygen via nasal cannula.  By history, most likely the second ground-level fall event post [place] is probably when her DVT in her legs traveled to her lungs to cause this PE.  The US of her lower extremity show a subacute and chronic clots in her legs.  Patient has been on heparin gtt due to renal function.  After discussion with social work and family, they have decided to start Eliquis and they will be able to afford. Patient will start Eliquis tonight, stop heparin gtt once Eliquis given.

## 2021-01-02 NOTE — ASSESSMENT & PLAN NOTE
She is jaundiced.  Right upper quadrant ultrasound positive for cholelithiasis.  MRCP without obstructive stone.  LFTs and bilirubin trending down.  Appreciate surgery consult, patient will need follow up and elective laparoscopic cholecystectomy in 1-2 months after stabilization of PE. Discussed with family as she will be travelling back to home in [place], [place].   Anesthesia Volume In Cc (Will Not Render If 0): 0.5

## 2021-01-02 NOTE — ED NOTES
Med Rec completed per patient, patients family and home medication list  Allergies reviewed  No ORAL antibiotics in last 14 days

## 2021-01-02 NOTE — H&P
Hospital Medicine History & Physical Note    Date of Service  1/1/2021    Primary Care Physician  No primary care provider on file.    Consultants  None    Code Status  Full Code    Chief Complaint  Chief Complaint   Patient presents with   • Shortness of Breath       History of Presenting Illness  89 y.o. female who presented 1/1/2021 with shortness of breath, increased weakness. She presented to an outside facility and was found to have bilateral PE with possible right heart strain. She reported that he had just recently came here from [place] on Dec. 22 and had a fall on Dec 26. Since that time she has been progressively more tired, weak and with decreased appetite. When she developed SOB and labored breathing, the family called EMS.     She was given therapeutic lovenox for her PE and a dose of zosyn for her elevated liver enzymes and possible cholecystitis, before transferring here for higher level of care.     In the ED, Echo and RUQ US were ordered. WBC 16.2, AST/ALT and alk phos elevated in the 200-300's, T bili 3. Trop T 47. She is saturating well on 2L NC. Per patient, her labored breathing resolved after she was started on supplemental O2.     Review of Systems  Review of Systems   Constitutional: Positive for malaise/fatigue. Negative for chills and fever.   Respiratory: Positive for shortness of breath. Negative for cough.    Cardiovascular: Negative for chest pain (from fractured ribs), palpitations and leg swelling.   Gastrointestinal: Negative for abdominal pain, blood in stool, nausea and vomiting.   Genitourinary: Negative for dysuria.   Musculoskeletal: Positive for falls.   Neurological: Positive for weakness. Negative for dizziness, sensory change, speech change, focal weakness, loss of consciousness and headaches.   Psychiatric/Behavioral: Negative.  The patient is not nervous/anxious and does not have insomnia.    All other systems reviewed and are negative.      Past Medical History   has a  past medical history of High cholesterol and Hypertension.    Surgical History   has no past surgical history on file.     Family History  family history is not on file.     Social History   reports that she has never smoked. She has never used smokeless tobacco. She reports current alcohol use. She reports that she does not use drugs.  No tobacco. Social alcohol. No illicit drug use.     Allergies  No Known Allergies    Medications  None       Physical Exam  Temp:  [36.7 °C (98 °F)] 36.7 °C (98 °F)  Pulse:  [66-75] 66  Resp:  [17-20] 20  BP: (125-149)/(62-71) 132/66  SpO2:  [98 %-99 %] 98 %    Physical Exam  Vitals signs reviewed.   Constitutional:       General: She is not in acute distress.     Appearance: She is not ill-appearing or diaphoretic.      Interventions: Nasal cannula in place.      Comments: Elderly but appears younger than stated age   HENT:      Head: Normocephalic.      Nose: No congestion.   Eyes:      General: No scleral icterus.     Extraocular Movements: Extraocular movements intact.      Pupils: Pupils are equal, round, and reactive to light.   Neck:      Musculoskeletal: Normal range of motion. No neck rigidity.   Cardiovascular:      Rate and Rhythm: Normal rate and regular rhythm.      Pulses: Normal pulses.      Heart sounds: No murmur.   Pulmonary:      Effort: Pulmonary effort is normal. No respiratory distress.      Breath sounds: No wheezing or rales.   Abdominal:      General: There is no distension.      Palpations: Abdomen is soft.      Tenderness: There is no abdominal tenderness. There is no guarding or rebound.   Musculoskeletal:         General: No tenderness.      Right lower leg: No edema.      Left lower leg: No edema.   Skin:     General: Skin is warm and dry.      Coloration: Skin is not jaundiced.   Neurological:      General: No focal deficit present.      Mental Status: She is alert and oriented to person, place, and time. Mental status is at baseline.   Psychiatric:          Mood and Affect: Mood normal.         Speech: Speech normal.         Behavior: Behavior normal. Behavior is cooperative.         Thought Content: Thought content normal.         Laboratory:  Recent Labs     01/01/21  1423   WBC 16.2*   RBC 3.83*   HEMOGLOBIN 12.1   HEMATOCRIT 36.2*   MCV 94.5   MCH 31.6   MCHC 33.4*   RDW 49.6   PLATELETCT 156*   MPV 10.2     Recent Labs     01/01/21  1423   SODIUM 133*   POTASSIUM 4.1   CHLORIDE 101   CO2 23   GLUCOSE 127*   BUN 23*   CREATININE 1.24   CALCIUM 9.0     Recent Labs     01/01/21  1423   ALTSGPT 202*   ASTSGOT 367*   ALKPHOSPHAT 266*   TBILIRUBIN 3.0*   LIPASE 16   GLUCOSE 127*         No results for input(s): NTPROBNP in the last 72 hours.      Recent Labs     01/01/21  1423   TROPONINT 47*       Imaging:  US-RUQ   Final Result         1. Cholelithiasis. No evidence of cholecystitis.   2. Mildly dilated CBD for patient's age. If there is clinical concern for choledocholithiasis, consider MRCP.         EC-ECHOCARDIOGRAM COMPLETE W/O CONT   Final Result      OUTSIDE IMAGES-CT CHEST/ABDOMEN/PELVIS   Final Result      OUTSIDE IMAGES-CT HEAD   Final Result      [id number]    (Results Pending)   US-EXTREMITY VENOUS LOWER BILAT    (Results Pending)         Assessment/Plan:  I anticipate this patient is appropriate for observation status at this time.    * Acute pulmonary embolism (HCC)- (present on admission)  Assessment & Plan  2/2 decreased mobilization from her recent fall in Oct, resulting in rib fractures? Complicated by hypoxia but hemodynamically stable.   - therapeutic lovenox  - will need pre auth before starting on oral anticoagulation  - bilateral LE venous US pending    Acute hypoxemic respiratory failure (HCC)- (present on admission)  Assessment & Plan  2/2 acute PE. Covid negative.   - RT protocol  - O2 supplementation as needed; not on home O2 at baseline  - management as above  - IS use with education    Hypertension- (present on  admission)  Assessment & Plan  Normotensive.   - continue home losartan    Depression- (present on admission)  Assessment & Plan  Stable at this time.   - continue home fluoxetine    Pulmonary hypertension (HCC)- (present on admission)  Assessment & Plan  Echo with grade I diastolic dysfunction and elevated RVSP at 66mmHg. Increased pressures 2/2 PE.  - management as above  - outpatient follow up    Stage 3 chronic kidney disease- (present on admission)  Assessment & Plan  At baseline.   - avoid nephrotoxic agents  - renally dose medications    Transaminitis- (present on admission)  Assessment & Plan  , , Alk phos 266, T bili 3 on admission. Examination is benign but will evaluate for choledocholithiasis.   - repeat tomorrow  - MRCP pending  - no indication for abx at this time but will monitor clinically    RUQ US  1. Cholelithiasis. No evidence of cholecystitis.  2. Mildly dilated CBD for patient's age. If there is clinical concern for choledocholithiasis, consider MRCP.    Hyperlipidemia  Assessment & Plan  - holding rosuvastatin 2/2 transaminitis

## 2021-01-02 NOTE — ASSESSMENT & PLAN NOTE
2/2 acute PE. Covid negative.   - RT protocol  - O2 supplementation as needed; not on home O2 at baseline  - management as above  - IS use with education

## 2021-01-02 NOTE — CARE PLAN
Problem: Communication  Goal: The ability to communicate needs accurately and effectively will improve  Outcome: PROGRESSING AS EXPECTED  Note: Encouraged pt to express any concerns she might have.     Problem: Safety  Goal: Will remain free from injury  Outcome: PROGRESSING AS EXPECTED  Note: Fall precautions in place. Bed in lowest position. Non-skid socks in place. Personal possessions within reach. Mobility sign on door. Bed-alarm on. Call light within reach. Pt educated regarding fall prevention and states understanding.

## 2021-01-02 NOTE — PROGRESS NOTES
Pt arrived to unit via wheelchair with transport at 1815. Pt oriented to room, unit, and plan of care. On 1L of oxygen Via NC. Denies any pain. All questions answered at this time. Call light within reach, and fall precautions in place.

## 2021-01-02 NOTE — PROGRESS NOTES
Bedside report received.  Pt is A&O X4, denies any pain at this time.  She is on 1L O2, nasal cannula, resting comfortably in bed.  Pt is able to stand and pivot to bedside commode with assistance   POC discussed with pt; all questions answered at this time.

## 2021-01-03 LAB
ALBUMIN SERPL BCP-MCNC: 3 G/DL (ref 3.2–4.9)
ALBUMIN/GLOB SERPL: 1 G/DL
ALP SERPL-CCNC: 532 U/L (ref 30–99)
ALT SERPL-CCNC: 161 U/L (ref 2–50)
ANION GAP SERPL CALC-SCNC: 11 MMOL/L (ref 7–16)
AST SERPL-CCNC: 188 U/L (ref 12–45)
BASOPHILS # BLD AUTO: 0.2 % (ref 0–1.8)
BASOPHILS # BLD: 0.02 K/UL (ref 0–0.12)
BILIRUB CONJ SERPL-MCNC: 2 MG/DL (ref 0.1–0.5)
BILIRUB SERPL-MCNC: 3.3 MG/DL (ref 0.1–1.5)
BUN SERPL-MCNC: 20 MG/DL (ref 8–22)
CALCIUM SERPL-MCNC: 8.7 MG/DL (ref 8.5–10.5)
CHLORIDE SERPL-SCNC: 102 MMOL/L (ref 96–112)
CO2 SERPL-SCNC: 24 MMOL/L (ref 20–33)
CREAT SERPL-MCNC: 0.95 MG/DL (ref 0.5–1.4)
EOSINOPHIL # BLD AUTO: 0.23 K/UL (ref 0–0.51)
EOSINOPHIL NFR BLD: 2.8 % (ref 0–6.9)
ERYTHROCYTE [DISTWIDTH] IN BLOOD BY AUTOMATED COUNT: 49 FL (ref 35.9–50)
GLOBULIN SER CALC-MCNC: 3.1 G/DL (ref 1.9–3.5)
GLUCOSE SERPL-MCNC: 102 MG/DL (ref 65–99)
HCT VFR BLD AUTO: 35.8 % (ref 37–47)
HGB BLD-MCNC: 11.8 G/DL (ref 12–16)
IMM GRANULOCYTES # BLD AUTO: 0.03 K/UL (ref 0–0.11)
IMM GRANULOCYTES NFR BLD AUTO: 0.4 % (ref 0–0.9)
LYMPHOCYTES # BLD AUTO: 1.17 K/UL (ref 1–4.8)
LYMPHOCYTES NFR BLD: 14.2 % (ref 22–41)
MCH RBC QN AUTO: 30.7 PG (ref 27–33)
MCHC RBC AUTO-ENTMCNC: 33 G/DL (ref 33.6–35)
MCV RBC AUTO: 93.2 FL (ref 81.4–97.8)
MONOCYTES # BLD AUTO: 0.88 K/UL (ref 0–0.85)
MONOCYTES NFR BLD AUTO: 10.7 % (ref 0–13.4)
NEUTROPHILS # BLD AUTO: 5.93 K/UL (ref 2–7.15)
NEUTROPHILS NFR BLD: 71.7 % (ref 44–72)
NRBC # BLD AUTO: 0 K/UL
NRBC BLD-RTO: 0 /100 WBC
PLATELET # BLD AUTO: 151 K/UL (ref 164–446)
PMV BLD AUTO: 10.7 FL (ref 9–12.9)
POTASSIUM SERPL-SCNC: 3.5 MMOL/L (ref 3.6–5.5)
PROT SERPL-MCNC: 6.1 G/DL (ref 6–8.2)
RBC # BLD AUTO: 3.84 M/UL (ref 4.2–5.4)
SODIUM SERPL-SCNC: 137 MMOL/L (ref 135–145)
UFH PPP CHRO-ACNC: 0.52 IU/ML
UFH PPP CHRO-ACNC: 0.6 IU/ML
UFH PPP CHRO-ACNC: 0.74 IU/ML
UFH PPP CHRO-ACNC: 1.04 IU/ML
WBC # BLD AUTO: 8.3 K/UL (ref 4.8–10.8)

## 2021-01-03 PROCEDURE — 85025 COMPLETE CBC W/AUTO DIFF WBC: CPT

## 2021-01-03 PROCEDURE — 99233 SBSQ HOSP IP/OBS HIGH 50: CPT | Performed by: INTERNAL MEDICINE

## 2021-01-03 PROCEDURE — 700105 HCHG RX REV CODE 258: Performed by: INTERNAL MEDICINE

## 2021-01-03 PROCEDURE — A9270 NON-COVERED ITEM OR SERVICE: HCPCS | Performed by: HOSPITALIST

## 2021-01-03 PROCEDURE — 82248 BILIRUBIN DIRECT: CPT

## 2021-01-03 PROCEDURE — 97162 PT EVAL MOD COMPLEX 30 MIN: CPT

## 2021-01-03 PROCEDURE — 700102 HCHG RX REV CODE 250 W/ 637 OVERRIDE(OP): Performed by: HOSPITALIST

## 2021-01-03 PROCEDURE — 770006 HCHG ROOM/CARE - MED/SURG/GYN SEMI*

## 2021-01-03 PROCEDURE — 36415 COLL VENOUS BLD VENIPUNCTURE: CPT

## 2021-01-03 PROCEDURE — 700111 HCHG RX REV CODE 636 W/ 250 OVERRIDE (IP): Performed by: INTERNAL MEDICINE

## 2021-01-03 PROCEDURE — 85520 HEPARIN ASSAY: CPT

## 2021-01-03 PROCEDURE — A9270 NON-COVERED ITEM OR SERVICE: HCPCS | Performed by: INTERNAL MEDICINE

## 2021-01-03 PROCEDURE — 700102 HCHG RX REV CODE 250 W/ 637 OVERRIDE(OP): Performed by: INTERNAL MEDICINE

## 2021-01-03 PROCEDURE — 80053 COMPREHEN METABOLIC PANEL: CPT

## 2021-01-03 RX ORDER — POTASSIUM CHLORIDE 20 MEQ/1
40 TABLET, EXTENDED RELEASE ORAL ONCE
Status: COMPLETED | OUTPATIENT
Start: 2021-01-03 | End: 2021-01-03

## 2021-01-03 RX ADMIN — HEPARIN SODIUM 13 UNITS/KG/HR: 5000 INJECTION, SOLUTION INTRAVENOUS at 19:57

## 2021-01-03 RX ADMIN — DOCUSATE SODIUM 50 MG AND SENNOSIDES 8.6 MG 2 TABLET: 8.6; 5 TABLET, FILM COATED ORAL at 04:18

## 2021-01-03 RX ADMIN — MULTIPLE VITAMINS W/ MINERALS TAB 1 TABLET: TAB at 04:19

## 2021-01-03 RX ADMIN — CEFTRIAXONE SODIUM 1 G: 1 INJECTION, POWDER, FOR SOLUTION INTRAMUSCULAR; INTRAVENOUS at 04:19

## 2021-01-03 RX ADMIN — HEPARIN SODIUM 15 UNITS/KG/HR: 5000 INJECTION, SOLUTION INTRAVENOUS at 02:56

## 2021-01-03 RX ADMIN — POTASSIUM CHLORIDE 40 MEQ: 1500 TABLET, EXTENDED RELEASE ORAL at 13:54

## 2021-01-03 RX ADMIN — HEPARIN SODIUM 13 UNITS/KG/HR: 5000 INJECTION, SOLUTION INTRAVENOUS at 09:13

## 2021-01-03 RX ADMIN — HEPARIN SODIUM 15 UNITS/KG/HR: 5000 INJECTION, SOLUTION INTRAVENOUS at 07:39

## 2021-01-03 RX ADMIN — ACETAMINOPHEN 650 MG: 325 TABLET, FILM COATED ORAL at 07:52

## 2021-01-03 RX ADMIN — FERROUS SULFATE TAB 325 MG (65 MG ELEMENTAL FE) 325 MG: 325 (65 FE) TAB at 04:19

## 2021-01-03 RX ADMIN — FLUOXETINE 20 MG: 20 CAPSULE ORAL at 04:19

## 2021-01-03 RX ADMIN — LOSARTAN POTASSIUM 50 MG: 50 TABLET, FILM COATED ORAL at 17:41

## 2021-01-03 RX ADMIN — ACETAMINOPHEN 650 MG: 325 TABLET, FILM COATED ORAL at 17:41

## 2021-01-03 ASSESSMENT — ENCOUNTER SYMPTOMS
DIARRHEA: 0
FEVER: 0
SHORTNESS OF BREATH: 0
ABDOMINAL PAIN: 0
WHEEZING: 0
CHILLS: 0
HEMOPTYSIS: 0
BLOOD IN STOOL: 0
COUGH: 0
SPUTUM PRODUCTION: 0

## 2021-01-03 ASSESSMENT — COGNITIVE AND FUNCTIONAL STATUS - GENERAL
STANDING UP FROM CHAIR USING ARMS: A LITTLE
WALKING IN HOSPITAL ROOM: A LITTLE
CLIMB 3 TO 5 STEPS WITH RAILING: A LITTLE
MOVING FROM LYING ON BACK TO SITTING ON SIDE OF FLAT BED: A LITTLE
SUGGESTED CMS G CODE MODIFIER MOBILITY: CK
MOBILITY SCORE: 18
TURNING FROM BACK TO SIDE WHILE IN FLAT BAD: A LITTLE
MOVING TO AND FROM BED TO CHAIR: A LITTLE

## 2021-01-03 ASSESSMENT — GAIT ASSESSMENTS: GAIT LEVEL OF ASSIST: UNABLE TO PARTICIPATE

## 2021-01-03 ASSESSMENT — PAIN DESCRIPTION - PAIN TYPE: TYPE: ACUTE PAIN

## 2021-01-03 NOTE — PROGRESS NOTES
HOSPITAL MEDICINE PROGRESS NOTE    Date of service  1/3/2021    Chief Complaint  Shortness of Breath      Hospital Course:     89-year-old woman who presented on transfer from an outside hospital for acute PE.          Interval History Updates:  No event overnight.  Afebrile.    I discussed the history with her daughter over the phone.  Patient has a fall on October 24, 2020 and subsequently was not as active as he used to be.  She was visiting her daughter near [place] for the holiday and suffered another fall surrounding the after [place].,  Most likely this represents an event where a DVT clot travel to her lung causing a PE.  Subsequently, the patient become more lethargic, more hypoxic and ultimately ended up in the ER at [facility] in [place] due to hypoxia and shortness of breath, was diagnosed with an acute PE at that time before transferring here for higher level of care.    Consultants/Specialty  None    Review of Systems   Review of Systems   Constitutional: Negative for chills and fever.   Respiratory: Negative for cough, hemoptysis, sputum production, shortness of breath and wheezing.    Cardiovascular: Negative for chest pain.   Gastrointestinal: Negative for abdominal pain, blood in stool and diarrhea.   Skin: Negative for itching and rash.        Medications:  • cefTRIAXone (ROCEPHIN) IV  1 g Q24HRS   • heparin  0-30 Units/kg/hr Continuous   • heparin  40 Units/kg PRN   • senna-docusate  2 Tab BID    And   • polyethylene glycol/lytes  1 Packet QDAY PRN    And   • magnesium hydroxide  30 mL QDAY PRN    And   • bisacodyl  10 mg QDAY PRN   • Respiratory Therapy Consult   Continuous RT   • acetaminophen  650 mg Q6HRS PRN   • ondansetron  4 mg Q4HRS PRN   • ondansetron  4 mg Q4HRS PRN   • ferrous sulfate  325 mg QAM   • FLUoxetine  20 mg QAM   • fluticasone  2 Spray Q EVENING   • therapeutic multivitamin-minerals  1 Tab QAM   • losartan  50 mg Q EVENING           Physical Exam   Vitals:    01/02/21 1918  01/02/21 2100 01/03/21 0330 01/03/21 0750   BP: 151/72  153/75 152/72   Pulse: 80  79 71   Resp: 18  18 18   Temp: 36.2 °C (97.2 °F)  36.4 °C (97.6 °F) 36.7 °C (98.1 °F)   TempSrc: Temporal  Temporal Axillary   SpO2: 96%  95% 99%   Weight:  61.2 kg (135 lb)     Height:           Physical Exam   Constitutional: She is oriented to person, place, and time and well-developed, well-nourished, and in no distress. No distress.   HENT:   Head: Normocephalic and atraumatic.   Eyes: Scleral icterus is present.   Cardiovascular: Normal rate and regular rhythm. Exam reveals no gallop.   Pulmonary/Chest: Effort normal and breath sounds normal.   Abdominal: Soft. Bowel sounds are normal. She exhibits distension.   Positive for mild tenderness at the right upper quadrant but no rebound or guarding.   Neurological: She is alert and oriented to person, place, and time.   Skin: Skin is warm and dry. She is not diaphoretic.   Psychiatric: Mood and affect normal.   Vitals reviewed.         Laboratory   Recent Labs     01/01/21  1423 01/02/21  0152 01/03/21  0020   WBC 16.2* 10.1 8.3   RBC 3.83* 3.59* 3.84*   HEMOGLOBIN 12.1 11.2* 11.8*   HEMATOCRIT 36.2* 34.2* 35.8*   PLATELETCT 156* 143* 151*     Recent Labs     01/01/21  1423 01/02/21  0152 01/03/21  0020   SODIUM 133* 135 137   POTASSIUM 4.1 3.7 3.5*   CHLORIDE 101 102 102   CO2 23 24 24   GLUCOSE 127* 109* 102*   BUN 23* 24* 20   CREATININE 1.24 1.16 0.95      Recent Labs     01/01/21  1423 01/02/21  0152 01/03/21  0020   ALTSGPT 202* 189* 161*   ASTSGOT 367* 259* 188*   ALKPHOSPHAT 266* 307* 532*   TBILIRUBIN 3.0* 3.4* 3.3*   LIPASE 16  --   --    GLUCOSE 127* 109* 102*      Recent Labs     01/02/21  1143   APTT 40.4*   INR 1.16*           Microbiology  Results     Procedure Component Value Units Date/Time    URINE CULTURE-EXISTING-LESS THAN 48 HOURS [[id number]] Collected: 01/01/21 8750    Order Status: Completed Specimen: Urine, Clean Catch Updated: 01/02/21 1349    Narrative:    "   Indication for culture:->Patient WITHOUT an indwelling Montiel  catheter in place with new onset of Dysuria, Frequency,  Urgency, and/or Suprapubic pain    URINALYSIS [[id number]]  (Abnormal) Collected: 01/01/21 1758    Order Status: Completed Specimen: Urine Updated: 01/01/21 1914     Color Yellow     Character Clear     Specific Gravity 1.027     Ph 7.0     Glucose Negative mg/dL      Ketones Negative mg/dL      Protein 30 mg/dL      Bilirubin Negative     Urobilinogen, Urine 0.2     Nitrite Negative     Leukocyte Esterase Moderate     Occult Blood Moderate     Micro Urine Req Microscopic    Narrative:      Droplet, Contact, and Eye Protection    CoV-2, Flu A/B, And RSV by PCR [[id number]] Collected: 01/01/21 1453    Order Status: Completed Updated: 01/01/21 1556     Influenza virus A RNA Negative     Influenza virus B, PCR Negative     RSV, PCR Negative     SARS-CoV-2 by PCR NotDetected     Comment: PATIENTS: Important information regarding your results and instructions can  be found at [email or web address]   \"After your  Covid-19 Test\"  [facility] providers: PLEASE REFER TO DE-ESCALATION AND RETESTING PROTOCOL  on [email or web address]  **The [facility] GeneXpert Xpress SARS-CoV-2 Test has been made available for  use under the Emergency Use Authorization (EUA) only.          SARS-CoV-2 Source NP Swab    Narrative:      Droplet, Contact, and Eye Protection  Rule-out COVID-19 panel, includes droplet/contact/eye  isolation order.  Check influenza to order this test only if  specifically indicated.  Is patient being admitted?->Yes  Does this patient meet criteria for Rush/Cepheid per [facility]  Inpatient Workflow? (See workflow link below)->Yes  Expected turn around time?->Rush (Cepheid 2-4 hours)  Have you been in close contact with a person who is suspected  or known to be positive for COVID-19 within the last 30 days  (e.g. last seen that person < 30 days ago)->Unknown    COVID/SARS CoV-2 PCR " [[id number]] Collected: 01/01/21 1453    Order Status: Completed Specimen: Respirate from Nasopharyngeal Updated: 01/01/21 1508     COVID Order Status Received     Comment: The order for SARS CoV-2 testing has been received by the  Laboratory. This result is neither positive nor negative.  Final results of testing will report in 24-48 hours, separately.         Narrative:      Droplet, Contact, and Eye Protection  Rule-out COVID-19 panel, includes droplet/contact/eye  isolation order.  Check influenza to order this test only if  specifically indicated.  Is patient being admitted?->Yes  Does this patient meet criteria for Rush/Cepheid per [facility]  Inpatient Workflow? (See workflow link below)->Yes  Expected turn around time?->Rush (Cepheid 2-4 hours)  Have you been in close contact with a person who is suspected  or known to be positive for COVID-19 within the last 30 days  (e.g. last seen that person < 30 days ago)->Unknown             Labs reviewed by me and noted above.    Radiology  [id number]  Narrative: 1/2/2021 1:04 PM    HISTORY/REASON FOR EXAM:  Cholelithiasis.  Right upper quadrant pain, jaundice. Ultrasound positive for cholelithiasis.    TECHNIQUE/EXAM DESCRIPTION: Magnetic resonance cholangiopancreatography.    Magnetic resonance cholangiopancreatography was performed on with axial, coronal, and sagittal thin and thick section heavily T2-weighted sequences as well as 3-D post-processed images.    The study was performed on a [employer] Signa 1.5 Hawa MRI scanner.    COMPARISON: Right upper quadrant ultrasound 1/1/2021, outside films CT of the chest 1/1/2021    FINDINGS:  The gallbladder shows dependently layering material with punctate components compatible with gallstones. There is no gallbladder wall thickening or pericholecystic fluid.    There is mild prominence of the left intrahepatic biliary radicle. No dilatation of intrahepatic biliary ducts on the right.    Borderline prominence of the common  hepatic duct which measure 7 mm. Mild dilatation of the common bile duct which measures 8 mm. No evidence of choledocholithiasis.    There is mild diffuse dilatation of the pancreatic duct which measures about 4 mm. There is also some dilatation of sidebranches of the pancreatic duct in the distal pancreatic body and pancreatic tail.    Moderate-sized hiatus hernia.  Impression: 1.  Cholelithiasis. No MR imaging findings indicative of acute cholecystitis.  2.  Borderline prominence of the common hepatic duct measuring 7 mm and mild dilatation of the common bile duct measuring 8 mm.  3.  No appreciable choledocholithiasis.  4.  Dilatation of the common pancreatic duct. Dilatation of small side branches are also noted. This may represent sequela of prior pancreatitis.  5.  Incidentally noted moderate-sized hiatus hernia.  US-EXTREMITY VENOUS LOWER BILAT   Vascular Laboratory   CONCLUSIONS   No acute thrombosis is identified.      Subacute thrombosis right, chronic bilaterally.      CARTER PATRICK     Exam Date:     2021 08:39     Room #:     Inpatient     Priority:     Routine     Ht (in):             Wt (lb):     Ordering Physician:        FORTUNATO SÁNCHEZ     Referring Physician:       [id number] Serrano     Sonographer:               Branden Deras [id number]     Study Type:                Complete Bilateral     Technical Quality:         Adequate     Age:    89    Gender:     F     MRN:    [id number]     :    [id number]      BSA:     Indications:     Pulmonary Embolus, Shortness of breath     CPT Codes:       71800     ICD Codes:       415.19  R06.02     History:         shortness of breath; no prior exams     Limitations:     PROCEDURES:   Venous duplex imaging was performed in both lower extremities including    serial compressions and spectral Doppler flow evaluation.     FINDINGS:   RIGHT:   Incompressible common femoral vein & proximal femoral vein (1 of 2) with    mixed echogenic material occluding the lumen  consistent with subacute deep    vein thrombosis.  Flow in the profunda femoral vein is reversed.     Continuous flow is observed in the 2nd patent femoral vein.   Partial incompressibility of the popliteal & a peroneal vein in the calf    with chronic appearing strand of thrombus partially filling the lumen in    the popliteal; the peroneal vein thrombus may be subacute.   The posterior tibial veins are fully compressible.   LEFT:   Chronic residua of prior thrombus observed in the popliteal vein which is    non-occlusive.   All other veins appear fully compressible where visualized with normal    venous flow demonstrated.   No evidence of acute deep vein thrombosis.     Vin Khan MD   (Electronically Signed)   Final Date:      02 January 2021                     13:18      No results found for this or any previous visit.       Assessment and Plan      * Acute pulmonary embolism (HCC)- (present on admission)  Assessment & Plan  With hypoxia which is expected.  However, patient remained hemodynamically stable without any subjective chest pain and only required 1 L of supplemental oxygen via nasal cannula.  By history, most likely the second ground-level fall event post [place] is probably when her DVT in her legs traveled to her lungs to cause this PE.  The US of her lower extremity show a subacute and chronic clots in her legs.  Given her renal function, cont heparin gtt.    Need pre-authorization for DOAC; Eliquis prescription floated to outpt pharmacy.    Obstructive jaundice- (present on admission)  Assessment & Plan  She is jaundiced.  Right upper quadrant ultrasound positive for cholelithiasis.  MRCP without obstructive stone.  Consult Dr. Schilling for lap humaira.  Will see if he thinks she needs an ERCP first.      Acute cystitis without hematuria- (present on admission)  Assessment & Plan  Positive urinalysis.  We will send existing urine for culture.  Empiric treatment with  ceftriaxone.    Hypertension- (present on admission)  Assessment & Plan  Normotensive.   - continue home losartan    Depression- (present on admission)  Assessment & Plan  Stable at this time.   - continue home fluoxetine    Pulmonary hypertension (HCC)- (present on admission)  Assessment & Plan  Echo with grade I diastolic dysfunction and elevated RVSP at 66mmHg. Increased pressures 2/2 PE.  - management as above  - outpatient follow up    Stage 3 chronic kidney disease- (present on admission)  Assessment & Plan  At baseline.   - avoid nephrotoxic agents  - renally dose medications    Hyperlipidemia  Assessment & Plan  - holding rosuvastatin 2/2 transaminitis      DVT prophylaxis: Lovenox    CODE STATUS:  FULL CODE    Disposition: TBD.

## 2021-01-03 NOTE — PROGRESS NOTES
Assumed care of pt at shift change. Pt is on 1L of oxygen via NC with no signs of acute distress. A&Ox4. Reports pain to right upper abdomen, pt medicated per MAR for pain. Heparin infusion verified with charge RN, running at 15/units/kg/hr. All comfort measures in place. Call light and personal belongings by bedside. Bed locked and in lowest position. Hourly rounding in place.

## 2021-01-04 LAB
ALBUMIN SERPL BCP-MCNC: 3.4 G/DL (ref 3.2–4.9)
ALBUMIN/GLOB SERPL: 0.9 G/DL
ALP SERPL-CCNC: 980 U/L (ref 30–99)
ALT SERPL-CCNC: 168 U/L (ref 2–50)
ANION GAP SERPL CALC-SCNC: 9 MMOL/L (ref 7–16)
AST SERPL-CCNC: 168 U/L (ref 12–45)
BACTERIA UR CULT: NORMAL
BASOPHILS # BLD AUTO: 0.4 % (ref 0–1.8)
BASOPHILS # BLD: 0.03 K/UL (ref 0–0.12)
BILIRUB SERPL-MCNC: 2.4 MG/DL (ref 0.1–1.5)
BUN SERPL-MCNC: 15 MG/DL (ref 8–22)
CALCIUM SERPL-MCNC: 9.1 MG/DL (ref 8.5–10.5)
CHLORIDE SERPL-SCNC: 104 MMOL/L (ref 96–112)
CO2 SERPL-SCNC: 23 MMOL/L (ref 20–33)
CREAT SERPL-MCNC: 0.92 MG/DL (ref 0.5–1.4)
EOSINOPHIL # BLD AUTO: 0.13 K/UL (ref 0–0.51)
EOSINOPHIL NFR BLD: 1.9 % (ref 0–6.9)
ERYTHROCYTE [DISTWIDTH] IN BLOOD BY AUTOMATED COUNT: 49.8 FL (ref 35.9–50)
GLOBULIN SER CALC-MCNC: 3.7 G/DL (ref 1.9–3.5)
GLUCOSE SERPL-MCNC: 145 MG/DL (ref 65–99)
HCT VFR BLD AUTO: 37.4 % (ref 37–47)
HGB BLD-MCNC: 12.2 G/DL (ref 12–16)
IMM GRANULOCYTES # BLD AUTO: 0.03 K/UL (ref 0–0.11)
IMM GRANULOCYTES NFR BLD AUTO: 0.4 % (ref 0–0.9)
LYMPHOCYTES # BLD AUTO: 1.13 K/UL (ref 1–4.8)
LYMPHOCYTES NFR BLD: 16.3 % (ref 22–41)
MCH RBC QN AUTO: 30.3 PG (ref 27–33)
MCHC RBC AUTO-ENTMCNC: 32.6 G/DL (ref 33.6–35)
MCV RBC AUTO: 93 FL (ref 81.4–97.8)
MONOCYTES # BLD AUTO: 0.67 K/UL (ref 0–0.85)
MONOCYTES NFR BLD AUTO: 9.7 % (ref 0–13.4)
NEUTROPHILS # BLD AUTO: 4.94 K/UL (ref 2–7.15)
NEUTROPHILS NFR BLD: 71.3 % (ref 44–72)
NRBC # BLD AUTO: 0 K/UL
NRBC BLD-RTO: 0 /100 WBC
PLATELET # BLD AUTO: 171 K/UL (ref 164–446)
PMV BLD AUTO: 10.8 FL (ref 9–12.9)
POTASSIUM SERPL-SCNC: 4.8 MMOL/L (ref 3.6–5.5)
PROT SERPL-MCNC: 7.1 G/DL (ref 6–8.2)
RBC # BLD AUTO: 4.02 M/UL (ref 4.2–5.4)
SIGNIFICANT IND 70042: NORMAL
SITE SITE: NORMAL
SODIUM SERPL-SCNC: 136 MMOL/L (ref 135–145)
SOURCE SOURCE: NORMAL
UFH PPP CHRO-ACNC: 0.42 IU/ML
WBC # BLD AUTO: 6.9 K/UL (ref 4.8–10.8)

## 2021-01-04 PROCEDURE — 700105 HCHG RX REV CODE 258: Performed by: INTERNAL MEDICINE

## 2021-01-04 PROCEDURE — 36415 COLL VENOUS BLD VENIPUNCTURE: CPT

## 2021-01-04 PROCEDURE — 700111 HCHG RX REV CODE 636 W/ 250 OVERRIDE (IP): Performed by: INTERNAL MEDICINE

## 2021-01-04 PROCEDURE — 99232 SBSQ HOSP IP/OBS MODERATE 35: CPT | Performed by: INTERNAL MEDICINE

## 2021-01-04 PROCEDURE — 770006 HCHG ROOM/CARE - MED/SURG/GYN SEMI*

## 2021-01-04 PROCEDURE — A9270 NON-COVERED ITEM OR SERVICE: HCPCS | Performed by: INTERNAL MEDICINE

## 2021-01-04 PROCEDURE — 700102 HCHG RX REV CODE 250 W/ 637 OVERRIDE(OP): Performed by: HOSPITALIST

## 2021-01-04 PROCEDURE — 700102 HCHG RX REV CODE 250 W/ 637 OVERRIDE(OP): Performed by: INTERNAL MEDICINE

## 2021-01-04 PROCEDURE — 85520 HEPARIN ASSAY: CPT

## 2021-01-04 PROCEDURE — 80053 COMPREHEN METABOLIC PANEL: CPT

## 2021-01-04 PROCEDURE — 85025 COMPLETE CBC W/AUTO DIFF WBC: CPT

## 2021-01-04 PROCEDURE — 97166 OT EVAL MOD COMPLEX 45 MIN: CPT

## 2021-01-04 PROCEDURE — A9270 NON-COVERED ITEM OR SERVICE: HCPCS | Performed by: HOSPITALIST

## 2021-01-04 RX ORDER — AMLODIPINE BESYLATE 5 MG/1
2.5 TABLET ORAL
Status: DISCONTINUED | OUTPATIENT
Start: 2021-01-04 | End: 2021-01-06 | Stop reason: HOSPADM

## 2021-01-04 RX ADMIN — LOSARTAN POTASSIUM 50 MG: 50 TABLET, FILM COATED ORAL at 17:07

## 2021-01-04 RX ADMIN — CEFTRIAXONE SODIUM 1 G: 1 INJECTION, POWDER, FOR SOLUTION INTRAMUSCULAR; INTRAVENOUS at 05:02

## 2021-01-04 RX ADMIN — MULTIPLE VITAMINS W/ MINERALS TAB 1 TABLET: TAB at 05:01

## 2021-01-04 RX ADMIN — AMLODIPINE BESYLATE 2.5 MG: 5 TABLET ORAL at 08:41

## 2021-01-04 RX ADMIN — FERROUS SULFATE TAB 325 MG (65 MG ELEMENTAL FE) 325 MG: 325 (65 FE) TAB at 08:40

## 2021-01-04 RX ADMIN — FLUOXETINE 20 MG: 20 CAPSULE ORAL at 05:01

## 2021-01-04 RX ADMIN — FLUTICASONE PROPIONATE 100 MCG: 50 SPRAY, METERED NASAL at 17:08

## 2021-01-04 ASSESSMENT — COGNITIVE AND FUNCTIONAL STATUS - GENERAL
SUGGESTED CMS G CODE MODIFIER DAILY ACTIVITY: CK
DRESSING REGULAR UPPER BODY CLOTHING: A LITTLE
DRESSING REGULAR LOWER BODY CLOTHING: A LITTLE
HELP NEEDED FOR BATHING: A LITTLE
TOILETING: A LITTLE
DAILY ACTIVITIY SCORE: 19
PERSONAL GROOMING: A LITTLE

## 2021-01-04 ASSESSMENT — ENCOUNTER SYMPTOMS
SHORTNESS OF BREATH: 0
SPUTUM PRODUCTION: 0
BLOOD IN STOOL: 0
FEVER: 0
COUGH: 0
ABDOMINAL PAIN: 0
WHEEZING: 0
DIARRHEA: 0
HEMOPTYSIS: 0
CHILLS: 0

## 2021-01-04 ASSESSMENT — ACTIVITIES OF DAILY LIVING (ADL): TOILETING: INDEPENDENT

## 2021-01-04 ASSESSMENT — PAIN DESCRIPTION - PAIN TYPE: TYPE: ACUTE PAIN

## 2021-01-04 NOTE — THERAPY
"Occupational Therapy   Initial Evaluation     Patient Name: Jen Ramos  Age:  89 y.o., Sex:  female  Medical Record #: [id number]  Today's Date: 1/4/2021     Precautions  Precautions: Fall Risk  Comments: significantly dizzy with mobility     Assessment  Patient is 89 y.o. female with a GLF while visiting [place] with PE and transferred from [facility]. Pt demonstrates decreased activity tolerance, decreased balance, and decreased strength limiting pt's safety with ADLs/mobility. Pt would benefit from skilled OT services in the acute care setting to address these deficits. Recommend sitting in chair for meals and ambulating unit with staff throughout the day for increased activity levels.    Plan    Recommend Occupational Therapy 3 times per week until therapy goals are met for the following treatments:  Adaptive Equipment, Cognitive Skill Development, Community Re-integration, Neuro Re-Education / Balance, Self Care/Activities of Daily Living, Therapeutic Activities and Therapeutic Exercises.    DC Equipment Recommendations: None  Discharge Recommendations: Recommend home health for continued occupational therapy services     Subjective    \"I haven't had any falls since my son-in-law installed the rails and bars\"     Objective       01/04/21 1002   Prior Living Situation   Prior Services None   Housing / Facility 3 Story House   Steps Into Home 4   Steps In Home   (2x FOS, but bedroom and WIS on 1st floor)   Bathroom Set up Walk In Shower;Grab Bars;Shower Chair   Equipment Owned Front-Wheel Walker;4-Wheel Walker;Single Point Cane;Tub / Shower Seat;Grab Bar(s) In Tub / Shower;Grab Bar(s) By Toilet;Raised Toilet Seat Without Arms   Lives with - Patient's Self Care Capacity Adult Children   Comments Pt reports daughter and son-in-law live with pt and provide assist for all IADLs, and that daughter will provide 24/7 support upon return home.    Prior Level of ADL Function   Self Feeding Independent "   Grooming / Hygiene Independent   Bathing Independent   Dressing Independent   Toileting Independent   Prior Level of IADL Function   Medication Management Requires Assist   Laundry Requires Assist   Kitchen Mobility Requires Assist   Finances Requires Assist   Home Management Requires Assist   Shopping Requires Assist   Prior Level Of Mobility Independent Without Device in Home   Comments family assist   History of Falls   History of Falls Yes  (this admission and in past)   Date of Last Fall   (Pt reports no falls since grab bars/railings installed)   Precautions   Precautions Fall Risk   Cognition    Cognition / Consciousness WDL   Level of Consciousness Alert   Comments pleasant, cooperative, and receptive to education   Balance Assessment   Sitting Balance (Static) Good   Sitting Balance (Dynamic) Good   Standing Balance (Static) Fair   Standing Balance (Dynamic) Fair -   Weight Shift Sitting Good   Weight Shift Standing Fair   Comments FWW, poor activity tolerance and SOB with min exertion   Bed Mobility    Supine to Sit Supervised   Sit to Supine Supervised   Scooting Supervised   Comments HOB flat, bed rail down   ADL Assessment   Grooming Supervision;Standing  (wash hands at sink)   Upper Body Dressing   (deferred d/t IVs in place and no snaps on gown sleeve)   Lower Body Dressing Supervision  (doff socks, don socks/underwear/pants)   Toileting Minimal Assist  (for safety, physical assist not needed)   Comments Pt required rest breaks during LB dressing d/t SOB. Pt required min VCs throughout session for safe FWW management   Functional Mobility   Sit to Stand Supervised   Bed, Chair, Wheelchair Transfer Supervised   Toilet Transfers Minimal Assist  (pt reports grab bars and toilet riser at home)   Transfer Method Stand Step  (FWW)   Mobility bed>toilet>sink>chair, FWW, SBA   Comments Pt demo 1 posterior LOB requiring min A to correct when ambulating to chair after toileting/hand hygiene. Pt educated on  importance of having daughter with her AAT upon return home to reduce fall risk, pt verbalized understanding and verbalized good insight into current deficits   Activity Tolerance   Sitting in Chair seated in chair at end of session   Sitting Edge of Bed 10 mins   Standing 10 mins total   Patient / Family Goals   Patient / Family Goal #1 To go home   Short Term Goals   Short Term Goal # 1 Pt will complete 20 mins of ADL routine at supervsion demo good safety x 5 sessions   Short Term Goal # 2 Pt will complete functional transfefers to/form chair/toilet/EOB at supervision  demo good FWW safety x 5 sessions   Anticipated Discharge Equipment and Recommendations   DC Equipment Recommendations None   Discharge Recommendations Recommend home health for continued occupational therapy services

## 2021-01-04 NOTE — THERAPY
"Physical Therapy   Initial Evaluation     Patient Name: Jen Ramos  Age:  89 y.o., Sex:  female  Medical Record #: [id number]  Today's Date: 1/3/2021     Precautions: (P) Fall Risk    Assessment  Patient is 89 y.o. female with a diagnosis of acute PE transferred from [facility]. Patient lives with daughter and son-in-law in [place]. Was visiting the [place] area when had a fall and was brought the ER. Patient reports performing ADLs IND, and receives assist from daughter and son-in-law for IADLs. Patient enjoys gardening and going on walks. PT evaluation limited by significant dizziness upon sitting EOB. Baseline /70s.Patient able to perform bed mobility with SBA and sat EOB x2mins, however pateint complaining of significnat dizziness, saying \"I am going to pass out.\" Therapist unable to obtain BP in seated position, before patient returning back to bed. VSS- once back to bed. Anticipate patient will make good progress with mobility once dizzy symptoms subside.   Plan    Recommend Physical Therapy 3 times per week until therapy goals are met for the following treatments:  Gait Training, Neuro Re-Education / Balance and Therapeutic Activities    DC Equipment Recommendations: (P) Unable to determine at this time  Discharge Recommendations: (P) Other -(unable to determine at this time) Patient ideally will return home with family support, however mobility during PT evaluation limited d/t dizziness.           Objective       01/03/21 1530   Prior Living Situation   Prior Services None   Housing / Facility 3 Story House  (can sleep on first floor if needed)   Steps Into Home 0   Steps In Home   (2 FOS)   Bathroom Set up Bathtub / Shower Combination;Walk In Shower   Equipment Owned Front-Wheel Walker;Single Point Cane;Wheelchair;Tub / Shower Seat;Grab Bar(s) By Toilet;Grab Bar(s) In Tub / Shower;Bed Side Commode   Lives with - Patient's Self Care Capacity Adult Children   Comments lives with dtr and CHANDRA who assist with " IADLs- work during daytime, home at night    Prior Level of Functional Mobility   Bed Mobility Independent   Transfer Status Independent   Ambulation Independent   Distance Ambulation (Feet)   (limited community ambulator )   Assistive Devices Used None   Stairs Independent   Comments enjoys gardening;    Balance Assessment   Sitting Balance (Static) Poor +   Sitting Balance (Dynamic) Poor   Weight Shift Sitting Poor   Gait Analysis   Gait Level Of Assist Unable to Participate   Bed Mobility    Supine to Sit Supervised   Sit to Supine Supervised   Scooting Supervised   Rolling Supervised   Functional Mobility   Sit to Stand Unable to Participate   Short Term Goals    Short Term Goal # 1 Patient will be able to demonstrate transfers STS with LRAD and SBA in 6 visits in order to return home.   Short Term Goal # 2 Patient will be able to demonstrate ambulation x200' with LRAD and SBA in 6 visits in order to return home.   Short Term Goal # 3 Patient will be able to tolerate sitting in chair n6mcaes in order to return home safely.   Anticipated Discharge Equipment and Recommendations   DC Equipment Recommendations Unable to determine at this time   Discharge Recommendations Other -  (unable to determine at this time)

## 2021-01-04 NOTE — PROGRESS NOTES
A&Ox4. 1 L O2 in place. C/o pain in right upper abdomen, refused intervention and preferred to rest. Heparin Xa result is 0.52, second therapeutic value, rate change not required. 13 units/kg/hr running. Pt calling appropriately to bathroom, bedside commode in use.

## 2021-01-04 NOTE — CARE PLAN
Problem: Communication  Goal: The ability to communicate needs accurately and effectively will improve  Outcome: PROGRESSING AS EXPECTED  Intervention: Educate patient and significant other/support system about the plan of care, procedures, treatments, medications and allow for questions  Note: Discussed plan of care with pt, pt verbalized understanding.     Problem: Safety  Goal: Will remain free from falls  Outcome: PROGRESSING AS EXPECTED  Intervention: Implement fall precautions  Note: Pt educated to call for assistance, bed alarm in place, call light within reach, bed locked in lowest position.

## 2021-01-04 NOTE — PROGRESS NOTES
HOSPITAL MEDICINE PROGRESS NOTE    Date of service  1/4/2021    Chief Complaint  Shortness of Breath      Hospital Course:     89-year-old woman who presented on transfer from an outside hospital for acute PE.          Interval History Updates:  No event overnight.  Afebrile.    I discussed the history with her daughter over the phone.  Patient has a fall on October 24, 2020 and subsequently was not as active as he used to be.  She was visiting her daughter near [place] for the holiday and suffered another fall surrounding the after [place].,  Most likely this represents an event where a DVT clot travel to her lung causing a PE.  Subsequently, the patient become more lethargic, more hypoxic and ultimately ended up in the ER at [facility] [place] due to hypoxia and shortness of breath, was diagnosed with an acute PE at that time before transferring here for higher level of care.    Consultants/Specialty  None    Review of Systems   Review of Systems   Constitutional: Negative for chills and fever.   Respiratory: Negative for cough, hemoptysis, sputum production, shortness of breath and wheezing.    Cardiovascular: Negative for chest pain.   Gastrointestinal: Negative for abdominal pain, blood in stool and diarrhea.   Skin: Negative for itching and rash.        Medications:  • amLODIPine  2.5 mg Q DAY   • heparin  0-30 Units/kg/hr Continuous   • heparin  40 Units/kg PRN   • senna-docusate  2 Tab BID    And   • polyethylene glycol/lytes  1 Packet QDAY PRN    And   • magnesium hydroxide  30 mL QDAY PRN    And   • bisacodyl  10 mg QDAY PRN   • Respiratory Therapy Consult   Continuous RT   • acetaminophen  650 mg Q6HRS PRN   • ondansetron  4 mg Q4HRS PRN   • ondansetron  4 mg Q4HRS PRN   • ferrous sulfate  325 mg QAM   • FLUoxetine  20 mg QAM   • fluticasone  2 Spray Q EVENING   • therapeutic multivitamin-minerals  1 Tab QAM   • losartan  50 mg Q EVENING           Physical Exam   Vitals:    01/03/21 1925 01/04/21 0340  01/04/21 0740 01/04/21 0841   BP: 140/44 160/73 (!) 176/91 (!) 170/83   Pulse: 73 75 82    Resp: 18 16 16    Temp: 36.7 °C (98 °F) 36.8 °C (98.2 °F) 36.4 °C (97.5 °F)    TempSrc: Temporal Temporal Temporal    SpO2: 94% 95% 95%    Weight:       Height:           Physical Exam   Constitutional: She is oriented to person, place, and time and well-developed, well-nourished, and in no distress. No distress.   HENT:   Head: Normocephalic and atraumatic.   Eyes: Scleral icterus is present.   Cardiovascular: Normal rate and regular rhythm. Exam reveals no gallop.   Pulmonary/Chest: Effort normal and breath sounds normal.   Abdominal: Soft. Bowel sounds are normal. She exhibits distension.   Positive for mild tenderness at the right upper quadrant but no rebound or guarding.   Neurological: She is alert and oriented to person, place, and time.   Skin: Skin is warm and dry. She is not diaphoretic.   Psychiatric: Mood and affect normal.   Vitals reviewed.         Laboratory   Recent Labs     01/01/21  1423 01/02/21  0152 01/03/21  0020   WBC 16.2* 10.1 8.3   RBC 3.83* 3.59* 3.84*   HEMOGLOBIN 12.1 11.2* 11.8*   HEMATOCRIT 36.2* 34.2* 35.8*   PLATELETCT 156* 143* 151*     Recent Labs     01/01/21  1423 01/02/21  0152 01/03/21  0020   SODIUM 133* 135 137   POTASSIUM 4.1 3.7 3.5*   CHLORIDE 101 102 102   CO2 23 24 24   GLUCOSE 127* 109* 102*   BUN 23* 24* 20   CREATININE 1.24 1.16 0.95      Recent Labs     01/01/21  1423 01/02/21  0152 01/03/21  0020 01/03/21  2156   ALTSGPT 202* 189* 161*  --    ASTSGOT 367* 259* 188*  --    ALKPHOSPHAT 266* 307* 532*  --    TBILIRUBIN 3.0* 3.4* 3.3*  --    DBILIRUBIN  --   --   --  2.0*   LIPASE 16  --   --   --    GLUCOSE 127* 109* 102*  --       Recent Labs     01/02/21  1143   APTT 40.4*   INR 1.16*           Microbiology  Results     Procedure Component Value Units Date/Time    URINE CULTURE-EXISTING-LESS THAN 48 HOURS [[id number]] Collected: 01/01/21 4955    Order Status: Completed  "Specimen: Urine, Clean Catch Updated: 01/04/21 0851     Significant Indicator NEG     Source UR     Site URINE, CLEAN CATCH     Culture Result Mixed skin aaron ,000 cfu/mL    Narrative:      Indication for culture:->Patient WITHOUT an indwelling Montiel  catheter in place with new onset of Dysuria, Frequency,  Urgency, and/or Suprapubic pain  Indication for culture:->Patient WITHOUT an indwelling Montiel    URINALYSIS [[id number]]  (Abnormal) Collected: 01/01/21 1758    Order Status: Completed Specimen: Urine Updated: 01/01/21 1914     Color Yellow     Character Clear     Specific Gravity 1.027     Ph 7.0     Glucose Negative mg/dL      Ketones Negative mg/dL      Protein 30 mg/dL      Bilirubin Negative     Urobilinogen, Urine 0.2     Nitrite Negative     Leukocyte Esterase Moderate     Occult Blood Moderate     Micro Urine Req Microscopic    Narrative:      Droplet, Contact, and Eye Protection    CoV-2, Flu A/B, And RSV by PCR [[id number]] Collected: 01/01/21 1453    Order Status: Completed Updated: 01/01/21 1556     Influenza virus A RNA Negative     Influenza virus B, PCR Negative     RSV, PCR Negative     SARS-CoV-2 by PCR NotDetected     Comment: PATIENTS: Important information regarding your results and instructions can  be found at [email or web address]   \"After your  Covid-19 Test\"  [facility] providers: PLEASE REFER TO DE-ESCALATION AND RETESTING PROTOCOL  on [email or web address]  **The CepAppcelerator GeneXpert Xpress SARS-CoV-2 Test has been made available for  use under the Emergency Use Authorization (EUA) only.          SARS-CoV-2 Source NP Swab    Narrative:      Droplet, Contact, and Eye Protection  Rule-out COVID-19 panel, includes droplet/contact/eye  isolation order.  Check influenza to order this test only if  specifically indicated.  Is patient being admitted?->Yes  Does this patient meet criteria for Rush/Cepheid per [facility]  Inpatient Workflow? (See workflow link " below)->Yes  Expected turn around time?->Rush (Cepheid 2-4 hours)  Have you been in close contact with a person who is suspected  or known to be positive for COVID-19 within the last 30 days  (e.g. last seen that person < 30 days ago)->Unknown    COVID/SARS CoV-2 PCR [[id number]] Collected: 01/01/21 1453    Order Status: Completed Specimen: Respirate from Nasopharyngeal Updated: 01/01/21 1508     COVID Order Status Received     Comment: The order for SARS CoV-2 testing has been received by the  Laboratory. This result is neither positive nor negative.  Final results of testing will report in 24-48 hours, separately.         Narrative:      Droplet, Contact, and Eye Protection  Rule-out COVID-19 panel, includes droplet/contact/eye  isolation order.  Check influenza to order this test only if  specifically indicated.  Is patient being admitted?->Yes  Does this patient meet criteria for Rush/Cepheid per [facility]  Inpatient Workflow? (See workflow link below)->Yes  Expected turn around time?->Rush (Cepheid 2-4 hours)  Have you been in close contact with a person who is suspected  or known to be positive for COVID-19 within the last 30 days  (e.g. last seen that person < 30 days ago)->Unknown             Labs reviewed by me and noted above.    Radiology  [id number]  Narrative: 1/2/2021 1:04 PM    HISTORY/REASON FOR EXAM:  Cholelithiasis.  Right upper quadrant pain, jaundice. Ultrasound positive for cholelithiasis.    TECHNIQUE/EXAM DESCRIPTION: Magnetic resonance cholangiopancreatography.    Magnetic resonance cholangiopancreatography was performed on with axial, coronal, and sagittal thin and thick section heavily T2-weighted sequences as well as 3-D post-processed images.    The study was performed on a [employer] 1.5 Hawa MRI scanner.    COMPARISON: Right upper quadrant ultrasound 1/1/2021, outside films CT of the chest 1/1/2021    FINDINGS:  The gallbladder shows dependently layering material with punctate  components compatible with gallstones. There is no gallbladder wall thickening or pericholecystic fluid.    There is mild prominence of the left intrahepatic biliary radicle. No dilatation of intrahepatic biliary ducts on the right.    Borderline prominence of the common hepatic duct which measure 7 mm. Mild dilatation of the common bile duct which measures 8 mm. No evidence of choledocholithiasis.    There is mild diffuse dilatation of the pancreatic duct which measures about 4 mm. There is also some dilatation of sidebranches of the pancreatic duct in the distal pancreatic body and pancreatic tail.    Moderate-sized hiatus hernia.  Impression: 1.  Cholelithiasis. No MR imaging findings indicative of acute cholecystitis.  2.  Borderline prominence of the common hepatic duct measuring 7 mm and mild dilatation of the common bile duct measuring 8 mm.  3.  No appreciable choledocholithiasis.  4.  Dilatation of the common pancreatic duct. Dilatation of small side branches are also noted. This may represent sequela of prior pancreatitis.  5.  Incidentally noted moderate-sized hiatus hernia.  US-EXTREMITY VENOUS LOWER BILAT   Vascular Laboratory   CONCLUSIONS   No acute thrombosis is identified.      Subacute thrombosis right, chronic bilaterally.      CARTER PATRICK     Exam Date:     2021 08:39     Room #:     Inpatient     Priority:     Routine     Ht (in):             Wt (lb):     Ordering Physician:        FORTUNATO SÁNCHEZ     Referring Physician:       [id number] Serrano     Sonographer:               Branden Deras [id number]     Study Type:                Complete Bilateral     Technical Quality:         Adequate     Age:    89    Gender:     F     MRN:    [id number]     :    [id number]      BSA:     Indications:     Pulmonary Embolus, Shortness of breath     CPT Codes:       47809     ICD Codes:       415.19  R06.02     History:         shortness of breath; no prior exams     Limitations:     PROCEDURES:   Venous  duplex imaging was performed in both lower extremities including    serial compressions and spectral Doppler flow evaluation.     FINDINGS:   RIGHT:   Incompressible common femoral vein & proximal femoral vein (1 of 2) with    mixed echogenic material occluding the lumen consistent with subacute deep    vein thrombosis.  Flow in the profunda femoral vein is reversed.     Continuous flow is observed in the 2nd patent femoral vein.   Partial incompressibility of the popliteal & a peroneal vein in the calf    with chronic appearing strand of thrombus partially filling the lumen in    the popliteal; the peroneal vein thrombus may be subacute.   The posterior tibial veins are fully compressible.   LEFT:   Chronic residua of prior thrombus observed in the popliteal vein which is    non-occlusive.   All other veins appear fully compressible where visualized with normal    venous flow demonstrated.   No evidence of acute deep vein thrombosis.     Vin Khan MD   (Electronically Signed)   Final Date:      02 January 2021                     13:18      No results found for this or any previous visit.       Assessment and Plan      * Acute pulmonary embolism (HCC)- (present on admission)  Assessment & Plan  With hypoxia which is expected.  However, patient remained hemodynamically stable without any subjective chest pain and only required 1 L of supplemental oxygen via nasal cannula.  By history, most likely the second ground-level fall event post [place] is probably when her DVT in her legs traveled to her lungs to cause this PE.  The US of her lower extremity show a subacute and chronic clots in her legs.  Given her renal function, cont heparin gtt.    Need pre-authorization for DOAC; Eliquis prescription floated to outpt pharmacy.  If ineligible for Eliquis, then coumadin with bridging.      Addendum  Discuss with pt's dtr.  They may pay for Eliquis if affordable.  MSW will assess payment and speak with dtr.       Obstructive jaundice- (present on admission)  Assessment & Plan  She is jaundiced.  Right upper quadrant ultrasound positive for cholelithiasis.  MRCP without obstructive stone.  Dr. Schilling wants to defer for lap humaira for now, but need to see LFTs stabilized.  Lab this AM pending.  May need ERCP if LFTs up trending.      Addendum  LFT better.  Likelly Dr. Schilling will rec outpt follow up for elective lap choly in 1-2 months    Acute cystitis without hematuria- (present on admission)  Assessment & Plan  Positive urinalysis.  NEG urine culture.  Stop ceftriaxone.    Hypertension- (present on admission)  Assessment & Plan  Uncontrolled.  Add lose dose amlodipine to losartan    Depression- (present on admission)  Assessment & Plan  Stable at this time.   - continue home fluoxetine    Pulmonary hypertension (HCC)- (present on admission)  Assessment & Plan  Echo with grade I diastolic dysfunction and elevated RVSP at 66mmHg. Increased pressures 2/2 PE.  - management as above  - outpatient follow up    Stage 3 chronic kidney disease- (present on admission)  Assessment & Plan  At baseline.   - avoid nephrotoxic agents  - renally dose medications    Hyperlipidemia  Assessment & Plan  - holding rosuvastatin 2/2 transaminitis      DVT prophylaxis: Fully anticoagulated with heparin drip.    CODE STATUS:  FULL CODE    Disposition: Anticipate home in 1-2 days.

## 2021-01-04 NOTE — PROGRESS NOTES
"    DATE: 1/4/2021    Hospital Day 3     Interval Events:  Abdominal pain resolved.    PHYSICAL EXAMINATION:  Vital Signs: /71   Pulse 78   Temp 36.2 °C (97.2 °F) (Temporal)   Resp 18   Ht 1.651 m (5' 5\")   Wt 61.2 kg (135 lb)   SpO2 98%     Abdomen is soft and non tender.    Laboratory Values:   Recent Labs     01/02/21  0152 01/03/21  0020 01/04/21  1453   WBC 10.1 8.3 6.9   RBC 3.59* 3.84* 4.02*   HEMOGLOBIN 11.2* 11.8* 12.2   HEMATOCRIT 34.2* 35.8* 37.4   MCV 95.3 93.2 93.0   MCH 31.2 30.7 30.3   MCHC 32.7* 33.0* 32.6*   RDW 49.4 49.0 49.8   PLATELETCT 143* 151* 171   MPV 10.3 10.7 10.8     Recent Labs     01/02/21  0152 01/03/21  0020 01/04/21  1316   SODIUM 135 137 136   POTASSIUM 3.7 3.5* 4.8   CHLORIDE 102 102 104   CO2 24 24 23   GLUCOSE 109* 102* 145*   BUN 24* 20 15   CREATININE 1.16 0.95 0.92   CALCIUM 8.8 8.7 9.1     Recent Labs     01/02/21  0152 01/02/21  1143 01/03/21  0020 01/03/21  2156 01/04/21  1316   ASTSGOT 259*  --  188*  --  168*   ALTSGPT 189*  --  161*  --  168*   TBILIRUBIN 3.4*  --  3.3*  --  2.4*   DBILIRUBIN  --   --   --  2.0*  --    ALKPHOSPHAT 307*  --  532*  --  980*   GLOBULIN 3.0  --  3.1  --  3.7*   INR  --  1.16*  --   --   --      Recent Labs     01/02/21  1143   APTT 40.4*   INR 1.16*        Imaging:   [id number]   Final Result      1.  Cholelithiasis. No MR imaging findings indicative of acute cholecystitis.   2.  Borderline prominence of the common hepatic duct measuring 7 mm and mild dilatation of the common bile duct measuring 8 mm.   3.  No appreciable choledocholithiasis.   4.  Dilatation of the common pancreatic duct. Dilatation of small side branches are also noted. This may represent sequela of prior pancreatitis.   5.  Incidentally noted moderate-sized hiatus hernia.      US-EXTREMITY VENOUS LOWER BILAT   Final Result      US-RUQ   Final Result         1. Cholelithiasis. No evidence of cholecystitis.   2. Mildly dilated CBD for patient's age. If there " is clinical concern for choledocholithiasis, consider MRCP.         EC-ECHOCARDIOGRAM COMPLETE W/O CONT   Final Result      OUTSIDE IMAGES-CT CHEST/ABDOMEN/PELVIS   Final Result      OUTSIDE IMAGES-CT HEAD   Final Result          ASSESSMENT AND PLAN:     Resolving hyperbilirubinemia. Suspect she may have passed a stone. Currently asymptomatic.  Given co morbidities and systemic anticoagulation, favor non operative management unless she becomes symtomatic or her enzymes fail to normalize.     ____________________________________     Carlos Mcarthur M.D.    DD: 1/4/2021  3:35 PM

## 2021-01-04 NOTE — FACE TO FACE
Face to Face Supporting Documentation - Home Health    The encounter with this patient was in whole or in part the primary reason for home health admission.    Date of encounter:   Patient:                    MRN:                       YOB: 2021  Jen Ramos  [id number]  3/22/1926     Home health to see patient for:  Skilled Nursing care for assessment, interventions & education, Physical Therapy evaluation and treatment and Occupational therapy evaluation and treatment    Skilled need for:  New Onset Medical Diagnosis DVT, PE, obstructive gallstones    Skilled nursing interventions to include:  Comment: None    Homebound status evidenced by:  Need the aid of supportive devices such as crutches, canes, wheelchairs or walkers or Needs the assistance of another person in order to leave the home. Leaving home requires a considerable and taxing effort. There is a normal inability to leave the home.    Community Physician to provide follow up care: No primary care provider on file.     Optional Interventions? No      I certify the face to face encounter for this home health care referral meets the CMS requirements and the encounter/clinical assessment with the patient was, in whole, or in part, for the medical condition(s) listed above, which is the primary reason for home health care. Based on my clinical findings: the service(s) are medically necessary, support the need for home health care, and the homebound criteria are met.  I certify that this patient has had a face to face encounter by myself.  Minerva Pelaez M.D. - NPI: [id number]

## 2021-01-04 NOTE — CONSULTS
DATE OF CONSULTATION:  1/3/2021     REFERRING PHYSICIAN:   Minerva Pelaez MD     CONSULTING PHYSICIAN:  Doe Schilling M.D.     REASON FOR CONSULTATION:    Obstructive jaundice    HISTORY OF PRESENT ILLNESS:   89-year-old female admitted to the hospital on 1/1/2021 with chief complaint of shortness of breath.  She was found to have DVT and PE and she has been started on anticoagulation.  Currently she is alert and conversant in no distress.  Her laboratory values showed obstructive pattern and these were trended over time and the bilirubin still stays around the range of 3 to 3.5.  She ultimately underwent MRCP which was negative for common bile duct obstruction.  However the bilirubin still remains elevated.  I have been consulted for recommendations.  When I came to see her she was alert and conversant in no distress, she says she is eating normally and having normal bowel movements, she does not have any significant abdominal pain or nausea or vomiting.  She does say she has right upper quadrant pain but when I examined her the pain is located over the ribs and she had 3 rib fractures in October due to a fall on the stairs.  There does not appear to be any tenderness over the gallbladder itself    PAST MEDICAL HISTORY:  has a past medical history of High cholesterol and Hypertension.    PAST SURGICAL HISTORY: patient denies any surgical history     ALLERGIES: No Known Allergies     CURRENT MEDICATIONS:   Home Medications     Reviewed by Sofya Traore, T (Pharmacy Tech) on 01/01/21 at 1701  Med List Status: Complete   Medication Last Dose Status   acetaminophen (TYLENOL) 500 MG Tab 12/31/2020 Active   acetaminophen-codeine #3 (TYLENOL #3) 300-30 MG Tab 1/1/2021 Active   Calcium Carb-Cholecalciferol (CALCIUM 600+D) 600-800 MG-UNIT Tab 12/31/2020 Active   CLINDAMYCIN PHOSPHATE,TOPICAL, 1 % Lotion 12/31/2020 Active   diclofenac sodium (VOLTAREN) 1 % Gel PRN Active   ferrous sulfate 325 (65 Fe)  "MG tablet 12/31/2020 Active   FLUoxetine (PROZAC) 20 MG Cap 12/31/2020 Active   fluticasone (FLONASE) 50 MCG/ACT nasal spray 12/31/2020 Active   losartan (COZAAR) 50 MG Tab 12/31/2020 Active   Multiple Vitamins-Minerals (CENTRUM ADULTS PO) 12/31/2020 Active   rosuvastatin (CRESTOR) 20 MG Tab 12/31/2020 Active                FAMILY HISTORY: History reviewed. No pertinent family history.    SOCIAL HISTORY:   Social History     Tobacco Use   • Smoking status: Never Smoker   • Smokeless tobacco: Never Used   Substance and Sexual Activity   • Alcohol use: Yes     Comment: occasionally   • Drug use: Never   • Sexual activity: Not on file       REVIEW OF SYSTEMS:  Noncontributory except as per HPI      PHYSICAL EXAMINATION:   General Appearance: 89-year-old pleasant female in no distress  VITAL SIGNS: /44   Pulse 73   Temp 36.7 °C (98 °F) (Temporal)   Resp 18   Ht 1.651 m (5' 5\")   Wt 61.2 kg (135 lb)   SpO2 94%     HEENT:  Normocephalic and atraumatic, EOMI, no icterus  Neck:   Supple, no JVD,   Cardiovascular: Regular rate and rhythm,   Pulmonary:  Good air entry bilaterally, unlabored  Abdominal:  Soft,  Non-distended     nontender to palpation      No rebound/guarding  Musculoskeletal: No edema, no tenderness  Neurological:  CN II-XII grossly intact, no focal deficits  Skin:   Skin is warm and dry. No rash noted.  Psychiatric:  Normal mood and affect.      LABORATORY VALUES:   Recent Labs     01/01/21  1423 01/02/21  0152 01/03/21  0020   WBC 16.2* 10.1 8.3   RBC 3.83* 3.59* 3.84*   HEMOGLOBIN 12.1 11.2* 11.8*   HEMATOCRIT 36.2* 34.2* 35.8*   MCV 94.5 95.3 93.2   MCH 31.6 31.2 30.7   MCHC 33.4* 32.7* 33.0*   RDW 49.6 49.4 49.0   PLATELETCT 156* 143* 151*   MPV 10.2 10.3 10.7     Recent Labs     01/01/21  1423 01/02/21  0152 01/03/21  0020   SODIUM 133* 135 137   POTASSIUM 4.1 3.7 3.5*   CHLORIDE 101 102 102   CO2 23 24 24   GLUCOSE 127* 109* 102*   BUN 23* 24* 20   CREATININE 1.24 1.16 0.95   CALCIUM 9.0 " 8.8 8.7     Recent Labs     01/01/21  1423 01/02/21  0152 01/02/21  1143 01/03/21  0020   ASTSGOT 367* 259*  --  188*   ALTSGPT 202* 189*  --  161*   TBILIRUBIN 3.0* 3.4*  --  3.3*   ALKPHOSPHAT 266* 307*  --  532*   GLOBULIN 2.9 3.0  --  3.1   INR  --   --  1.16*  --      Recent Labs     01/02/21  1143   APTT 40.4*   INR 1.16*        IMAGING:   [id number]   Final Result      1.  Cholelithiasis. No MR imaging findings indicative of acute cholecystitis.   2.  Borderline prominence of the common hepatic duct measuring 7 mm and mild dilatation of the common bile duct measuring 8 mm.   3.  No appreciable choledocholithiasis.   4.  Dilatation of the common pancreatic duct. Dilatation of small side branches are also noted. This may represent sequela of prior pancreatitis.   5.  Incidentally noted moderate-sized hiatus hernia.      US-EXTREMITY VENOUS LOWER BILAT   Final Result      US-RUQ   Final Result         1. Cholelithiasis. No evidence of cholecystitis.   2. Mildly dilated CBD for patient's age. If there is clinical concern for choledocholithiasis, consider MRCP.         EC-ECHOCARDIOGRAM COMPLETE W/O CONT   Final Result      OUTSIDE IMAGES-CT CHEST/ABDOMEN/PELVIS   Final Result      OUTSIDE IMAGES-CT HEAD   Final Result          IMPRESSION AND PLAN:    - Hyperbilirubinemia   Unclear cause, possibly passed a gallstone but if so the bilirubin level should start to trend down   We will check the morning labs and the bilirubin remains elevated then we will likely need a GI consult to further evaluate and the patient may need to go undergo either percutaneous cholangiogram or ERCP   If the bilirubin does trend down then the patient likely passed a stone but I would recommend deferring a cholecystectomy until she has had at least 1 to 2 months of anticoagulation for her PE and DVT    - DVT and PE   Continue therapeutic heparin for now       ____________________________________     Doe Schilling M.D.    DD:  1/3/2021  8:22 PM

## 2021-01-04 NOTE — PROGRESS NOTES
Unable to see the result of Heparin XA drawn at 1515. PC to lab and spoke to Jourdan to follow up and he said lab was already resulted at 1530 and result was 0.60. However I am not able to see result on my end.     Lab result of 0.60: No rate change required. Running at 13units/kg/hr

## 2021-01-04 NOTE — PROGRESS NOTES
Received report from NOC RN and assumed care of pt. Pt is A&Ox4, resting in bed on room air. Pt reports no pain at this time. Heparin infusion verified with charge RN, running at 13unit/kg/hr. Discussed plan of care with pt. Call light within reach, bed locked in lowest position, bed alarm on.

## 2021-01-05 LAB
ALBUMIN SERPL BCP-MCNC: 2.8 G/DL (ref 3.2–4.9)
ALBUMIN/GLOB SERPL: 0.8 G/DL
ALP SERPL-CCNC: 873 U/L (ref 30–99)
ALT SERPL-CCNC: 130 U/L (ref 2–50)
ANION GAP SERPL CALC-SCNC: 9 MMOL/L (ref 7–16)
AST SERPL-CCNC: 106 U/L (ref 12–45)
BASOPHILS # BLD AUTO: 0.8 % (ref 0–1.8)
BASOPHILS # BLD: 0.05 K/UL (ref 0–0.12)
BILIRUB SERPL-MCNC: 1.7 MG/DL (ref 0.1–1.5)
BUN SERPL-MCNC: 13 MG/DL (ref 8–22)
CALCIUM SERPL-MCNC: 8.5 MG/DL (ref 8.5–10.5)
CHLORIDE SERPL-SCNC: 105 MMOL/L (ref 96–112)
CO2 SERPL-SCNC: 21 MMOL/L (ref 20–33)
CREAT SERPL-MCNC: 0.75 MG/DL (ref 0.5–1.4)
EOSINOPHIL # BLD AUTO: 0.19 K/UL (ref 0–0.51)
EOSINOPHIL NFR BLD: 2.9 % (ref 0–6.9)
ERYTHROCYTE [DISTWIDTH] IN BLOOD BY AUTOMATED COUNT: 49.4 FL (ref 35.9–50)
GLOBULIN SER CALC-MCNC: 3.6 G/DL (ref 1.9–3.5)
GLUCOSE SERPL-MCNC: 119 MG/DL (ref 65–99)
HCT VFR BLD AUTO: 35.2 % (ref 37–47)
HGB BLD-MCNC: 11.7 G/DL (ref 12–16)
IMM GRANULOCYTES # BLD AUTO: 0.03 K/UL (ref 0–0.11)
IMM GRANULOCYTES NFR BLD AUTO: 0.5 % (ref 0–0.9)
LYMPHOCYTES # BLD AUTO: 1.32 K/UL (ref 1–4.8)
LYMPHOCYTES NFR BLD: 20.5 % (ref 22–41)
MCH RBC QN AUTO: 30.7 PG (ref 27–33)
MCHC RBC AUTO-ENTMCNC: 33.2 G/DL (ref 33.6–35)
MCV RBC AUTO: 92.4 FL (ref 81.4–97.8)
MONOCYTES # BLD AUTO: 0.72 K/UL (ref 0–0.85)
MONOCYTES NFR BLD AUTO: 11.2 % (ref 0–13.4)
NEUTROPHILS # BLD AUTO: 4.14 K/UL (ref 2–7.15)
NEUTROPHILS NFR BLD: 64.1 % (ref 44–72)
NRBC # BLD AUTO: 0 K/UL
NRBC BLD-RTO: 0 /100 WBC
PLATELET # BLD AUTO: 190 K/UL (ref 164–446)
PMV BLD AUTO: 10.7 FL (ref 9–12.9)
POTASSIUM SERPL-SCNC: 4 MMOL/L (ref 3.6–5.5)
PROT SERPL-MCNC: 6.4 G/DL (ref 6–8.2)
RBC # BLD AUTO: 3.81 M/UL (ref 4.2–5.4)
SODIUM SERPL-SCNC: 135 MMOL/L (ref 135–145)
WBC # BLD AUTO: 6.5 K/UL (ref 4.8–10.8)

## 2021-01-05 PROCEDURE — 700102 HCHG RX REV CODE 250 W/ 637 OVERRIDE(OP): Performed by: STUDENT IN AN ORGANIZED HEALTH CARE EDUCATION/TRAINING PROGRAM

## 2021-01-05 PROCEDURE — 700102 HCHG RX REV CODE 250 W/ 637 OVERRIDE(OP): Performed by: INTERNAL MEDICINE

## 2021-01-05 PROCEDURE — 700111 HCHG RX REV CODE 636 W/ 250 OVERRIDE (IP): Performed by: INTERNAL MEDICINE

## 2021-01-05 PROCEDURE — 80053 COMPREHEN METABOLIC PANEL: CPT

## 2021-01-05 PROCEDURE — A9270 NON-COVERED ITEM OR SERVICE: HCPCS | Performed by: STUDENT IN AN ORGANIZED HEALTH CARE EDUCATION/TRAINING PROGRAM

## 2021-01-05 PROCEDURE — 770006 HCHG ROOM/CARE - MED/SURG/GYN SEMI*

## 2021-01-05 PROCEDURE — 85025 COMPLETE CBC W/AUTO DIFF WBC: CPT

## 2021-01-05 PROCEDURE — A9270 NON-COVERED ITEM OR SERVICE: HCPCS | Performed by: INTERNAL MEDICINE

## 2021-01-05 PROCEDURE — A9270 NON-COVERED ITEM OR SERVICE: HCPCS | Performed by: HOSPITALIST

## 2021-01-05 PROCEDURE — 97530 THERAPEUTIC ACTIVITIES: CPT

## 2021-01-05 PROCEDURE — 99232 SBSQ HOSP IP/OBS MODERATE 35: CPT | Performed by: STUDENT IN AN ORGANIZED HEALTH CARE EDUCATION/TRAINING PROGRAM

## 2021-01-05 PROCEDURE — 36415 COLL VENOUS BLD VENIPUNCTURE: CPT

## 2021-01-05 PROCEDURE — 700102 HCHG RX REV CODE 250 W/ 637 OVERRIDE(OP): Performed by: HOSPITALIST

## 2021-01-05 RX ADMIN — FERROUS SULFATE TAB 325 MG (65 MG ELEMENTAL FE) 325 MG: 325 (65 FE) TAB at 08:30

## 2021-01-05 RX ADMIN — LOSARTAN POTASSIUM 50 MG: 50 TABLET, FILM COATED ORAL at 18:10

## 2021-01-05 RX ADMIN — MULTIPLE VITAMINS W/ MINERALS TAB 1 TABLET: TAB at 04:57

## 2021-01-05 RX ADMIN — FLUTICASONE PROPIONATE 100 MCG: 50 SPRAY, METERED NASAL at 18:10

## 2021-01-05 RX ADMIN — APIXABAN 10 MG: 5 TABLET, FILM COATED ORAL at 18:10

## 2021-01-05 RX ADMIN — DOCUSATE SODIUM 50 MG AND SENNOSIDES 8.6 MG 2 TABLET: 8.6; 5 TABLET, FILM COATED ORAL at 04:57

## 2021-01-05 RX ADMIN — FLUOXETINE 20 MG: 20 CAPSULE ORAL at 04:57

## 2021-01-05 RX ADMIN — AMLODIPINE BESYLATE 2.5 MG: 5 TABLET ORAL at 04:57

## 2021-01-05 RX ADMIN — HEPARIN SODIUM 13 UNITS/KG/HR: 5000 INJECTION, SOLUTION INTRAVENOUS at 01:47

## 2021-01-05 ASSESSMENT — ENCOUNTER SYMPTOMS
SHORTNESS OF BREATH: 0
DOUBLE VISION: 0
DIARRHEA: 0
NEUROLOGICAL NEGATIVE: 1
HALLUCINATIONS: 0
HEADACHES: 0
COUGH: 0
BLURRED VISION: 0
ABDOMINAL PAIN: 0
NECK PAIN: 0
BACK PAIN: 0
DIZZINESS: 0
MUSCULOSKELETAL NEGATIVE: 1
MYALGIAS: 0
WHEEZING: 0
BLOOD IN STOOL: 0
CHILLS: 0
PSYCHIATRIC NEGATIVE: 1
SPUTUM PRODUCTION: 0
TINGLING: 0
HEMOPTYSIS: 0
FEVER: 0

## 2021-01-05 ASSESSMENT — LIFESTYLE VARIABLES: SUBSTANCE_ABUSE: 0

## 2021-01-05 ASSESSMENT — COGNITIVE AND FUNCTIONAL STATUS - GENERAL
SUGGESTED CMS G CODE MODIFIER MOBILITY: CJ
STANDING UP FROM CHAIR USING ARMS: A LITTLE
CLIMB 3 TO 5 STEPS WITH RAILING: A LITTLE
MOBILITY SCORE: 21
WALKING IN HOSPITAL ROOM: A LITTLE

## 2021-01-05 ASSESSMENT — GAIT ASSESSMENTS
DEVIATION: NO DEVIATION
GAIT LEVEL OF ASSIST: MODIFIED INDEPENDENT
DISTANCE (FEET): 350
ASSISTIVE DEVICE: FRONT WHEEL WALKER

## 2021-01-05 NOTE — DISCHARGE SUMMARY
HOSPITAL MEDICINE DISCHARGE SUMMARY    DATE OF ADMISSION:  1/1/2021    DATE OF DISCHARGE:  1/6/2021    CHIEF COMPLAINT ON ADMISSION  Chief Complaint   Patient presents with   • Shortness of Breath       CODE STATUS  Full Code    No Known Allergies    DISCHARGE PROBLEM LIST  Principal Problem:    Acute pulmonary embolism (HCC) POA: Yes  Active Problems:    Stage 3 chronic kidney disease POA: Yes    Pulmonary hypertension (HCC) POA: Yes    Depression POA: Yes    Hypertension POA: Yes    Calculus of gallbladder without cholecystitis POA: Yes    Hyperlipidemia POA: Unknown  Resolved Problems:    Obstructive jaundice POA: Yes    Acute cystitis without hematuria POA: Yes      MEDICATIONS ON DISCHARGE     Medication List      START taking these medications      Instructions   amLODIPine 2.5 MG Tabs  Start taking on: January 7, 2021  Commonly known as: NORVASC   Take 1 Tab by mouth every day.  Dose: 2.5 mg     apixaban 5 MG Tabs tablet  Commonly known as: ELIQUIS   Take 1 Tab by mouth 2 Times a Day.  Dose: 5 mg        CONTINUE taking these medications      Instructions   acetaminophen 500 MG Tabs  Commonly known as: TYLENOL   Take 1,000 mg by mouth 2 (two) times a day.  Dose: 1,000 mg     acetaminophen-codeine #3 300-30 MG Tabs  Commonly known as: TYLENOL #3   Take 1 Tab by mouth every 8 hours as needed for Moderate Pain.  Dose: 1 Tab     Calcium 600+D 600-800 MG-UNIT Tabs  Generic drug: Calcium Carb-Cholecalciferol   Take 1 Tab by mouth every morning.  Dose: 1 Tab     CENTRUM ADULTS PO   Take 1 Tab by mouth every morning.  Dose: 1 Tab     CLINDAMYCIN PHOSPHATE(TOPICAL) 1 % Lotn   Apply 1 Application topically 2 times a day.  Dose: 1 Application     ferrous sulfate 325 (65 Fe) MG tablet   Take 325 mg by mouth every morning.  Dose: 325 mg     FLUoxetine 20 MG Caps  Commonly known as: PROZAC   Take 20 mg by mouth every morning.  Dose: 20 mg     fluticasone 50 MCG/ACT nasal spray  Commonly known as: FLONASE   Administer 2  Sprays into affected nostril(S) every evening.  Dose: 2 Spray     losartan 50 MG Tabs  Commonly known as: COZAAR   Take 50 mg by mouth every evening.  Dose: 50 mg     rosuvastatin 20 MG Tabs  Commonly known as: CRESTOR   Take 20 mg by mouth every evening.  Dose: 20 mg     Voltaren 1 % Gel  Generic drug: diclofenac sodium   Apply 1 Application topically 1 time a day as needed (pain).  Dose: 1 Application            CONSULTATIONS  General Surgery    HPI & HOSPITAL COURSE  89-year-old woman who presented on transfer from an outside hospital for acute PE.  She presented to the outside hospital complaining of shortness of breath and found to be hypoxic secondary to the acute PE.  During this admission, the patient was also found to have subacute acute DVT in the bilateral legs.  Her daughter report that after Bailey, she suffered a fall prior to becoming more short of breath.  More than likely, that fall was a result of the DVT thrombus broken off to cause the PE.    Patient was admitted here at our hospital and started on Lovenox, dose to her renal function.  She had acute kidney injury so was switched to heparin drip.  Patient does not have Medicare part D so unclear whether or not direct oral anticoagulant will be covered by her insurance. After discussion with care management and with family, they would be able to afford the cost of Eliquis and wanted to avoid Coumadin. She should remain on Eliquis for the next 6-9 months.    She has been doing well.  Her oxygen requirement is minimal and has been wean to RA without SOB or chest pain.     Incidentally, she was also found to have obstructive jaundice.  Ultrasound confirmed cholelithiasis but negative for choledocholithiasis. This is reaffirmed with MRCP.  Dr. Schilling, general surgeon, was consulted for an opinion regarding laparoscopic cholecystectomy, which he felt was better deferred for the time being given her acute PE.  This is also because her LFTs have been  downtrending and she is asymptomatic without any sign of hemodynamic instability.  Patient can follow-up with the general surgeon after discharge to plan for elective cholecystectomy in the next 1 to 2 months if she desires. I discussed this with family.    Despite having a positive urinalysis, her urine culture did not grow any organism.  She was treated empirically with ceftriaxone with resolution of symptoms.    Her hypertension is uncontrolled during this admission on losartan alone.  I have started her on low-dose amlodipine.  Given her advanced age, goal for blood pressure control would not be strict.    Due to her PE, echo show elevated RVSP at 66 millimercury.  Patient should follow-up with her primary care physicians in [facility] to monitor.    Acute kidney injury has resolved with IV fluid hydration.  It is unclear whether or not she carries a diagnosis of CKD.    Therefore, she is discharged in good and stable condition to home with organized home healthcare and close outpatient follow-up.    The patient met 2-midnight criteria for an inpatient stay at the time of discharge.    SPECIFIC OUTPATIENT FOLLOW-UP RECOMMENDATIONS  Follow up with PCP for 6-9 months of anticoagulation  Follow up with PCP to discuss need for future cholecystectomy    FOLLOW UP  Phi Barney M.D.  [address]  [facility] [address]  [phone]    Schedule an appointment as soon as possible for a visit in 1 week      Doe Schilling M.D.  [address] [facility] [address]  [facility] [address]  [phone]    Schedule an appointment as soon as possible for a visit in 2 weeks  Plan gallbladder surgery      DIET  Resume home diet previous to admission    ACTIVITY  Resume previous level of activities.    PROCEDURES  No surgery found  No surgery found    PERTINENT RESULTS  [id number]   Final Result      1.  Cholelithiasis. No MR imaging findings indicative of acute cholecystitis.   2.  Borderline prominence  of the common hepatic duct measuring 7 mm and mild dilatation of the common bile duct measuring 8 mm.   3.  No appreciable choledocholithiasis.   4.  Dilatation of the common pancreatic duct. Dilatation of small side branches are also noted. This may represent sequela of prior pancreatitis.   5.  Incidentally noted moderate-sized hiatus hernia.      US-EXTREMITY VENOUS LOWER BILAT   Final Result      US-RUQ   Final Result         1. Cholelithiasis. No evidence of cholecystitis.   2. Mildly dilated CBD for patient's age. If there is clinical concern for choledocholithiasis, consider MRCP.         EC-ECHOCARDIOGRAM COMPLETE W/O CONT   Final Result      OUTSIDE IMAGES-CT CHEST/ABDOMEN/PELVIS   Final Result      OUTSIDE IMAGES-CT HEAD   Final Result        Total time of the discharge process exceeds 36 minutes.

## 2021-01-05 NOTE — THERAPY
Physical Therapy   Daily Treatment     Patient Name: Jen Ramos  Age:  89 y.o., Sex:  female  Medical Record #: [id number]  Today's Date: 2021     Precautions: Fall Risk (pt has brief episodes of dizziness that she is able to identify and stops activity until it goes away)    Assessment    Pt with improvement this visit. Per the therapy chart notes, she has been a&o x4, however her RN stated she was confused overnight and this AM. During the PT session this afternoon she was a&o x4, pleasant and cooperative. Questionable if the pt is experiencing sundowning causing her night time and morning confusion. In addition, she has c/o dizziness with sitting EOB and 1x during gait. Both episodes were brief, she recognized them, and she stopped her activity until they resolved. She did not have any LOB during gait, she managed backwards stepping, sidestepping, high marching and turning demonstrating she is able to manage small spaces and open spaces w/o LOB. At this point, she has met her PT goals and has reached her maximum potential with PT in the acute setting.     Plan    Discharge secondary to goals met.    DC Equipment Recommendations: (P) None(pt has all necessary DME)  Discharge Recommendations: (P) Anticipate that the patient will have no further physical therapy needs after discharge from the hospital      Subjective    Pt pleasant and wanting to work with PT     Objective       21 1543   Cognition    Comments alert and oriented x4; understood her situation, the year, name, , location   Other Treatments   Other Treatments Provided sit <> stand activity; small space negotiation, forward, backward and sidestepping   Balance   Sitting Balance (Static) Good   Sitting Balance (Dynamic) Good   Standing Balance (Static) Good   Standing Balance (Dynamic) Fair +   Comments with FWW   Gait Analysis   Gait Level Of Assist Modified Independent   Assistive Device Front Wheel Walker   Distance (Feet) 350   # of Times  Distance was Traveled 1   Deviation No deviation   Comments pt was able to high step march to simulate clearance of her steps at home   Bed Mobility    Supine to Sit Modified Independent   Sit to Supine Modified Independent   Scooting Independent   Rolling Independent   Comments no bed accessories   Functional Mobility   Sit to Stand Modified Independent   Mobility small space negotiation and in hallway   Skilled Intervention Verbal Cuing   How much help from another person does the patient currently need...   6 clicks Mobility Score 21   Short Term Goals    Short Term Goal # 1 Patient will be able to demonstrate transfers STS with LRAD and SBA in 6 visits in order to return home.   Goal Outcome # 1 Goal met   Short Term Goal # 2 Patient will be able to demonstrate ambulation x200' with LRAD and SBA in 6 visits in order to return home.   Goal Outcome # 2 Goal met   Short Term Goal # 3 Patient will be able to tolerate sitting in chair [id number] in order to return home safely.   Goal Outcome # 3 Goal met  (pt left sitting at end of session)     Eve Martinez PT

## 2021-01-05 NOTE — PROGRESS NOTES
Assumed care of patient this shift. Patient is alert and oriented x 2, disoriented to place and situation, pleasantly confused. Denied any complaints of pain when asked. Up stand by assist in room and to bathroom. Heparin gtt continues at 13 units/kg/hour with next lab draw for Heparin Xa due at 2200. Fall prevention tactics in place, bed locked and in lowest position and bed alarm on for safety.     Svetlana Rivera R.N.

## 2021-01-05 NOTE — PROGRESS NOTES
Alert x2, start of shift, bed alarm in place. Hep gtt continues at goal rate; no change needed- continues at 13 units/kg/hr. Next draw for anti XA at 1/5/21 at 22:00. Cont plan of care, call light within reach

## 2021-01-05 NOTE — DISCHARGE PLANNING
Anticipated Discharge Disposition: Home    Action: PC to [facility], without Rx cost is $597.99.  BLANE reviewed Media Screen, patient does have Rx coverage, [employer].  PC to patient's daughter Odilia [phone]; BLANE told that patient has Medicare HMO, will forward patients' card.  PC to [place] [phone], SW provided [employer] information. Cost with deductible is $397.00  BLANE informed Dr Jimenez, he will call family.  Patient should be able to discharge tomorrow.     Barriers to Discharge: medical clearance    Plan: continue to monitor for discharge barriers

## 2021-01-05 NOTE — PROGRESS NOTES
HOSPITAL MEDICINE PROGRESS NOTE    Date of service  1/5/2021    Chief Complaint  Shortness of Breath      Hospital Course:     89-year-old woman who presented on transfer from an outside hospital for acute PE.    Patient has a fall on October 24, 2020 and subsequently was not as active as he used to be.  She was visiting her daughter near [place] for the holiday and suffered another fall surrounding the after [place].,  Most likely this represents an event where a DVT clot travel to her lung causing a PE.  Subsequently, the patient become more lethargic, more hypoxic and ultimately ended up in the ER at [facility] in [place] due to hypoxia and shortness of breath, was diagnosed with an acute PE at that time before transferring here for higher level of care.         Interval History Updates:  No event overnight.  Afebrile.    Patient feeling well today without abdominal pain, no nausea or vomiting. Denies any chest pain or SOB.      Consultants/Specialty  General Surgery    Review of Systems   Review of Systems   Constitutional: Negative for chills and fever.   HENT: Negative.    Eyes: Negative for blurred vision and double vision.   Respiratory: Negative for cough, hemoptysis, sputum production, shortness of breath and wheezing.    Cardiovascular: Negative for chest pain.   Gastrointestinal: Negative for abdominal pain, blood in stool and diarrhea.   Genitourinary: Negative.  Negative for dysuria, frequency and urgency.   Musculoskeletal: Negative.  Negative for back pain, myalgias and neck pain.   Skin: Negative for itching and rash.   Neurological: Negative.  Negative for dizziness, tingling and headaches.   Psychiatric/Behavioral: Negative.  Negative for hallucinations, substance abuse and suicidal ideas.        Medications:  • apixaban  10 mg BID    Followed by   • [START ON 1/12/2021] apixaban  5 mg BID   • amLODIPine  2.5 mg Q DAY   • heparin  0-30 Units/kg/hr Continuous   • heparin  40 Units/kg PRN   •  senna-docusate  2 Tab BID    And   • polyethylene glycol/lytes  1 Packet QDAY PRN    And   • magnesium hydroxide  30 mL QDAY PRN    And   • bisacodyl  10 mg QDAY PRN   • Respiratory Therapy Consult   Continuous RT   • acetaminophen  650 mg Q6HRS PRN   • ondansetron  4 mg Q4HRS PRN   • ondansetron  4 mg Q4HRS PRN   • ferrous sulfate  325 mg QAM   • FLUoxetine  20 mg QAM   • fluticasone  2 Spray Q EVENING   • therapeutic multivitamin-minerals  1 Tab QAM   • losartan  50 mg Q EVENING           Physical Exam   Vitals:    01/04/21 1455 01/04/21 1925 01/05/21 0300 01/05/21 0730   BP: 147/71 152/78 148/71 131/60   Pulse: 78 73 74 76   Resp: 18 16 16 18   Temp: 36.2 °C (97.2 °F) 36 °C (96.8 °F) 36.4 °C (97.5 °F) 36.6 °C (97.9 °F)   TempSrc: Temporal Temporal Temporal Temporal   SpO2: 98% 97% 98% 99%   Weight:       Height:           Physical Exam   Constitutional: She is oriented to person, place, and time and well-developed, well-nourished, and in no distress. No distress.   HENT:   Head: Normocephalic and atraumatic.   Eyes: Scleral icterus is present.   Cardiovascular: Normal rate and regular rhythm. Exam reveals no gallop.   Pulmonary/Chest: Effort normal and breath sounds normal.   Abdominal: Soft. Bowel sounds are normal. She exhibits no distension. There is no abdominal tenderness.   Positive for mild tenderness at the right upper quadrant but no rebound or guarding.   Musculoskeletal:         General: No tenderness or edema.   Neurological: She is alert and oriented to person, place, and time.   Skin: Skin is warm and dry. She is not diaphoretic.   Psychiatric: Mood and affect normal.   Vitals reviewed.         Laboratory   Recent Labs     01/03/21  0020 01/04/21  1453 01/05/21  0908   WBC 8.3 6.9 6.5   RBC 3.84* 4.02* 3.81*   HEMOGLOBIN 11.8* 12.2 11.7*   HEMATOCRIT 35.8* 37.4 35.2*   PLATELETCT 151* 171 190     Recent Labs     01/03/21  0020 01/04/21  1316 01/05/21  0908   SODIUM 137 136 135   POTASSIUM 3.5* 4.8  "4.0   CHLORIDE 102 104 105   CO2 24 23 21   GLUCOSE 102* 145* 119*   BUN 20 15 13   CREATININE 0.95 0.92 0.75      Recent Labs     01/03/21  0020 01/03/21  2156 01/04/21  1316 01/05/21  0908   ALTSGPT 161*  --  168* 130*   ASTSGOT 188*  --  168* 106*   ALKPHOSPHAT 532*  --  980* 873*   TBILIRUBIN 3.3*  --  2.4* 1.7*   DBILIRUBIN  --  2.0*  --   --    GLUCOSE 102*  --  145* 119*                Microbiology  Results     Procedure Component Value Units Date/Time    URINE CULTURE-EXISTING-LESS THAN 48 HOURS [[id number]] Collected: 01/01/21 1758    Order Status: Completed Specimen: Urine, Clean Catch Updated: 01/04/21 0851     Significant Indicator NEG     Source UR     Site URINE, CLEAN CATCH     Culture Result Mixed skin aaron ,000 cfu/mL    Narrative:      Indication for culture:->Patient WITHOUT an indwelling Montiel  catheter in place with new onset of Dysuria, Frequency,  Urgency, and/or Suprapubic pain  Indication for culture:->Patient WITHOUT an indwelling Montiel    URINALYSIS [[id number]]  (Abnormal) Collected: 01/01/21 1758    Order Status: Completed Specimen: Urine Updated: 01/01/21 1914     Color Yellow     Character Clear     Specific Gravity 1.027     Ph 7.0     Glucose Negative mg/dL      Ketones Negative mg/dL      Protein 30 mg/dL      Bilirubin Negative     Urobilinogen, Urine 0.2     Nitrite Negative     Leukocyte Esterase Moderate     Occult Blood Moderate     Micro Urine Req Microscopic    Narrative:      Droplet, Contact, and Eye Protection    CoV-2, Flu A/B, And RSV by PCR [[id number]] Collected: 01/01/21 1453    Order Status: Completed Updated: 01/01/21 1556     Influenza virus A RNA Negative     Influenza virus B, PCR Negative     RSV, PCR Negative     SARS-CoV-2 by PCR NotDetected     Comment: PATIENTS: Important information regarding your results and instructions can  be found at [email or web address]   \"After your  Covid-19 Test\"  [facility] providers: PLEASE REFER TO " DE-ESCALATION AND RETESTING PROTOCOL  on [email or web address]  **The [facility] GeneXpert Xpress SARS-CoV-2 Test has been made available for  use under the Emergency Use Authorization (EUA) only.          SARS-CoV-2 Source NP Swab    Narrative:      Droplet, Contact, and Eye Protection  Rule-out COVID-19 panel, includes droplet/contact/eye  isolation order.  Check influenza to order this test only if  specifically indicated.  Is patient being admitted?->Yes  Does this patient meet criteria for Rush/Cepheid per [facility]  Inpatient Workflow? (See workflow link below)->Yes  Expected turn around time?->Rush (Cepheid 2-4 hours)  Have you been in close contact with a person who is suspected  or known to be positive for COVID-19 within the last 30 days  (e.g. last seen that person < 30 days ago)->Unknown    COVID/SARS CoV-2 PCR [[id number]] Collected: 01/01/21 1453    Order Status: Completed Specimen: Respirate from Nasopharyngeal Updated: 01/01/21 1508     COVID Order Status Received     Comment: The order for SARS CoV-2 testing has been received by the  Laboratory. This result is neither positive nor negative.  Final results of testing will report in 24-48 hours, separately.         Narrative:      Droplet, Contact, and Eye Protection  Rule-out COVID-19 panel, includes droplet/contact/eye  isolation order.  Check influenza to order this test only if  specifically indicated.  Is patient being admitted?->Yes  Does this patient meet criteria for Rush/Cepheid per [facility]  Inpatient Workflow? (See workflow link below)->Yes  Expected turn around time?->Rush (Cepheid 2-4 hours)  Have you been in close contact with a person who is suspected  or known to be positive for COVID-19 within the last 30 days  (e.g. last seen that person < 30 days ago)->Unknown             Labs reviewed by me and noted above.    Radiology  [id number]  Narrative: 1/2/2021 1:04 PM    HISTORY/REASON FOR EXAM:  Cholelithiasis.  Right upper quadrant pain, jaundice.  Ultrasound positive for cholelithiasis.    TECHNIQUE/EXAM DESCRIPTION: Magnetic resonance cholangiopancreatography.    Magnetic resonance cholangiopancreatography was performed on with axial, coronal, and sagittal thin and thick section heavily T2-weighted sequences as well as 3-D post-processed images.    The study was performed on a [employer] 1.5 Hawa MRI scanner.    COMPARISON: Right upper quadrant ultrasound 1/1/2021, outside films CT of the chest 1/1/2021    FINDINGS:  The gallbladder shows dependently layering material with punctate components compatible with gallstones. There is no gallbladder wall thickening or pericholecystic fluid.    There is mild prominence of the left intrahepatic biliary radicle. No dilatation of intrahepatic biliary ducts on the right.    Borderline prominence of the common hepatic duct which measure 7 mm. Mild dilatation of the common bile duct which measures 8 mm. No evidence of choledocholithiasis.    There is mild diffuse dilatation of the pancreatic duct which measures about 4 mm. There is also some dilatation of sidebranches of the pancreatic duct in the distal pancreatic body and pancreatic tail.    Moderate-sized hiatus hernia.  Impression: 1.  Cholelithiasis. No MR imaging findings indicative of acute cholecystitis.  2.  Borderline prominence of the common hepatic duct measuring 7 mm and mild dilatation of the common bile duct measuring 8 mm.  3.  No appreciable choledocholithiasis.  4.  Dilatation of the common pancreatic duct. Dilatation of small side branches are also noted. This may represent sequela of prior pancreatitis.  5.  Incidentally noted moderate-sized hiatus hernia.  US-EXTREMITY VENOUS LOWER BILAT   Vascular Laboratory   CONCLUSIONS   No acute thrombosis is identified.      Subacute thrombosis right, chronic bilaterally.      CARTER PATRICK     Exam Date:     01/02/2021 08:39     Room #:     Inpatient     Priority:     Routine     Ht (in):             Wt  (lb):     Ordering Physician:        FORTUNATO SÁNCHEZ     Referring Physician:       [id number]     Sonographer:               Branden Deras [id number]     Study Type:                Complete Bilateral     Technical Quality:         Adequate     Age:    89    Gender:     F     MRN:    [id number]     :    [id number]      BSA:     Indications:     Pulmonary Embolus, Shortness of breath     CPT Codes:       99804     ICD Codes:       415.19  R06.02     History:         shortness of breath; no prior exams     Limitations:     PROCEDURES:   Venous duplex imaging was performed in both lower extremities including    serial compressions and spectral Doppler flow evaluation.     FINDINGS:   RIGHT:   Incompressible common femoral vein & proximal femoral vein (1 of 2) with    mixed echogenic material occluding the lumen consistent with subacute deep    vein thrombosis.  Flow in the profunda femoral vein is reversed.     Continuous flow is observed in the 2nd patent femoral vein.   Partial incompressibility of the popliteal & a peroneal vein in the calf    with chronic appearing strand of thrombus partially filling the lumen in    the popliteal; the peroneal vein thrombus may be subacute.   The posterior tibial veins are fully compressible.   LEFT:   Chronic residua of prior thrombus observed in the popliteal vein which is    non-occlusive.   All other veins appear fully compressible where visualized with normal    venous flow demonstrated.   No evidence of acute deep vein thrombosis.     Vin Khan MD   (Electronically Signed)   Final Date:      2021                     13:18      No results found for this or any previous visit.       Assessment and Plan      * Acute pulmonary embolism (HCC)- (present on admission)  Assessment & Plan  With hypoxia which is expected.  However, patient remained hemodynamically stable without any subjective chest pain and only required 1 L of supplemental oxygen via nasal cannula.   By history, most likely the second ground-level fall event post Christmas is probably when her DVT in her legs traveled to her lungs to cause this PE.  The US of her lower extremity show a subacute and chronic clots in her legs.  Patient has been on heparin gtt due to renal function.  After discussion with social work and family, they have decided to start Eliquis and they will be able to afford. Patient will start Eliquis tonight, stop heparin gtt once Eliquis given.    Obstructive jaundice- (present on admission)  Assessment & Plan  She is jaundiced.  Right upper quadrant ultrasound positive for cholelithiasis.  MRCP without obstructive stone.  LFTs and bilirubin trending down.  Appreciate surgery consult, patient will need follow up and elective laparoscopic cholecystectomy in 1-2 months after stabilization of PE. Discussed with family as she will be travelling back to home in [place], [place].    Acute cystitis without hematuria- (present on admission)  Assessment & Plan  Positive urinalysis.  NEG urine culture.  Stop ceftriaxone.    Hypertension- (present on admission)  Assessment & Plan  Uncontrolled.  Add lose dose amlodipine to losartan    Depression- (present on admission)  Assessment & Plan  Stable at this time.   - continue home fluoxetine    Pulmonary hypertension (HCC)- (present on admission)  Assessment & Plan  Echo with grade I diastolic dysfunction and elevated RVSP at 66mmHg. Increased pressures 2/2 PE.  - management as above  - outpatient follow up    Stage 3 chronic kidney disease- (present on admission)  Assessment & Plan  At baseline.   - avoid nephrotoxic agents  - renally dose medications    Hyperlipidemia  Assessment & Plan  - holding rosuvastatin 2/2 transaminitis      DVT prophylaxis: Fully anticoagulated with heparin drip.    CODE STATUS:  FULL CODE    Disposition: Anticipate home in 1-2 days.

## 2021-01-06 VITALS
HEIGHT: 65 IN | DIASTOLIC BLOOD PRESSURE: 79 MMHG | RESPIRATION RATE: 17 BRPM | HEART RATE: 86 BPM | WEIGHT: 135 LBS | BODY MASS INDEX: 22.49 KG/M2 | TEMPERATURE: 96.5 F | OXYGEN SATURATION: 96 % | SYSTOLIC BLOOD PRESSURE: 152 MMHG

## 2021-01-06 PROBLEM — K80.20 CALCULUS OF GALLBLADDER WITHOUT CHOLECYSTITIS: Status: ACTIVE | Noted: 2021-01-06

## 2021-01-06 PROBLEM — K83.1 OBSTRUCTIVE JAUNDICE: Status: RESOLVED | Noted: 2021-01-01 | Resolved: 2021-01-06

## 2021-01-06 PROBLEM — N30.00 ACUTE CYSTITIS WITHOUT HEMATURIA: Status: RESOLVED | Noted: 2021-01-02 | Resolved: 2021-01-06

## 2021-01-06 LAB
ALBUMIN SERPL BCP-MCNC: 2.8 G/DL (ref 3.2–4.9)
ALBUMIN/GLOB SERPL: 0.9 G/DL
ALP SERPL-CCNC: 773 U/L (ref 30–99)
ALT SERPL-CCNC: 103 U/L (ref 2–50)
ANION GAP SERPL CALC-SCNC: 6 MMOL/L (ref 7–16)
AST SERPL-CCNC: 79 U/L (ref 12–45)
BASOPHILS # BLD AUTO: 0.5 % (ref 0–1.8)
BASOPHILS # BLD: 0.03 K/UL (ref 0–0.12)
BILIRUB SERPL-MCNC: 1.3 MG/DL (ref 0.1–1.5)
BUN SERPL-MCNC: 15 MG/DL (ref 8–22)
CALCIUM SERPL-MCNC: 8.4 MG/DL (ref 8.5–10.5)
CHLORIDE SERPL-SCNC: 106 MMOL/L (ref 96–112)
CO2 SERPL-SCNC: 20 MMOL/L (ref 20–33)
CREAT SERPL-MCNC: 0.8 MG/DL (ref 0.5–1.4)
EOSINOPHIL # BLD AUTO: 0.23 K/UL (ref 0–0.51)
EOSINOPHIL NFR BLD: 3.6 % (ref 0–6.9)
ERYTHROCYTE [DISTWIDTH] IN BLOOD BY AUTOMATED COUNT: 50.4 FL (ref 35.9–50)
GLOBULIN SER CALC-MCNC: 3.1 G/DL (ref 1.9–3.5)
GLUCOSE SERPL-MCNC: 95 MG/DL (ref 65–99)
HCT VFR BLD AUTO: 32.9 % (ref 37–47)
HGB BLD-MCNC: 11 G/DL (ref 12–16)
IMM GRANULOCYTES # BLD AUTO: 0.03 K/UL (ref 0–0.11)
IMM GRANULOCYTES NFR BLD AUTO: 0.5 % (ref 0–0.9)
LYMPHOCYTES # BLD AUTO: 1.89 K/UL (ref 1–4.8)
LYMPHOCYTES NFR BLD: 29.6 % (ref 22–41)
MCH RBC QN AUTO: 31.1 PG (ref 27–33)
MCHC RBC AUTO-ENTMCNC: 33.4 G/DL (ref 33.6–35)
MCV RBC AUTO: 92.9 FL (ref 81.4–97.8)
MONOCYTES # BLD AUTO: 0.81 K/UL (ref 0–0.85)
MONOCYTES NFR BLD AUTO: 12.7 % (ref 0–13.4)
NEUTROPHILS # BLD AUTO: 3.4 K/UL (ref 2–7.15)
NEUTROPHILS NFR BLD: 53.1 % (ref 44–72)
NRBC # BLD AUTO: 0 K/UL
NRBC BLD-RTO: 0 /100 WBC
PLATELET # BLD AUTO: 191 K/UL (ref 164–446)
PMV BLD AUTO: 11 FL (ref 9–12.9)
POTASSIUM SERPL-SCNC: 4 MMOL/L (ref 3.6–5.5)
PROT SERPL-MCNC: 5.9 G/DL (ref 6–8.2)
RBC # BLD AUTO: 3.54 M/UL (ref 4.2–5.4)
SODIUM SERPL-SCNC: 132 MMOL/L (ref 135–145)
WBC # BLD AUTO: 6.4 K/UL (ref 4.8–10.8)

## 2021-01-06 PROCEDURE — 85025 COMPLETE CBC W/AUTO DIFF WBC: CPT

## 2021-01-06 PROCEDURE — 80053 COMPREHEN METABOLIC PANEL: CPT

## 2021-01-06 PROCEDURE — 700102 HCHG RX REV CODE 250 W/ 637 OVERRIDE(OP): Performed by: INTERNAL MEDICINE

## 2021-01-06 PROCEDURE — 700102 HCHG RX REV CODE 250 W/ 637 OVERRIDE(OP): Performed by: STUDENT IN AN ORGANIZED HEALTH CARE EDUCATION/TRAINING PROGRAM

## 2021-01-06 PROCEDURE — 99239 HOSP IP/OBS DSCHRG MGMT >30: CPT | Performed by: STUDENT IN AN ORGANIZED HEALTH CARE EDUCATION/TRAINING PROGRAM

## 2021-01-06 PROCEDURE — A9270 NON-COVERED ITEM OR SERVICE: HCPCS | Performed by: STUDENT IN AN ORGANIZED HEALTH CARE EDUCATION/TRAINING PROGRAM

## 2021-01-06 PROCEDURE — 700102 HCHG RX REV CODE 250 W/ 637 OVERRIDE(OP): Performed by: HOSPITALIST

## 2021-01-06 PROCEDURE — A9270 NON-COVERED ITEM OR SERVICE: HCPCS | Performed by: INTERNAL MEDICINE

## 2021-01-06 PROCEDURE — A9270 NON-COVERED ITEM OR SERVICE: HCPCS | Performed by: HOSPITALIST

## 2021-01-06 PROCEDURE — 36415 COLL VENOUS BLD VENIPUNCTURE: CPT

## 2021-01-06 RX ORDER — AMLODIPINE BESYLATE 2.5 MG/1
2.5 TABLET ORAL DAILY
Qty: 30 TAB | Refills: 3 | Status: SHIPPED | OUTPATIENT
Start: 2021-01-07

## 2021-01-06 RX ADMIN — MULTIPLE VITAMINS W/ MINERALS TAB 1 TABLET: TAB at 05:02

## 2021-01-06 RX ADMIN — FLUOXETINE 20 MG: 20 CAPSULE ORAL at 05:02

## 2021-01-06 RX ADMIN — FERROUS SULFATE TAB 325 MG (65 MG ELEMENTAL FE) 325 MG: 325 (65 FE) TAB at 08:12

## 2021-01-06 RX ADMIN — APIXABAN 10 MG: 5 TABLET, FILM COATED ORAL at 05:02

## 2021-01-06 RX ADMIN — DOCUSATE SODIUM 50 MG AND SENNOSIDES 8.6 MG 2 TABLET: 8.6; 5 TABLET, FILM COATED ORAL at 05:02

## 2021-01-06 RX ADMIN — AMLODIPINE BESYLATE 2.5 MG: 5 TABLET ORAL at 05:02

## 2021-01-06 NOTE — DISCHARGE INSTRUCTIONS
Discharge Instructions    Discharged to home by car with relative. Discharged via wheelchair, hospital escort: Yes.  Special equipment needed: Wheelchair    Be sure to schedule a follow-up appointment with your primary care doctor or any specialists as instructed.     Discharge Plan:   Influenza Vaccine Indication: Not indicated: Previously immunized this influenza season and > 8 years of age    I understand that a diet low in cholesterol, fat, and sodium is recommended for good health. Unless I have been given specific instructions below for another diet, I accept this instruction as my diet prescription.   Other diet: n/a    Special Instructions: None    · Is patient discharged on Warfarin / Coumadin?   Pt leaving with Eliquis    Depression / Suicide Risk    As you are discharged from this [facility] facility, it is important to learn how to keep safe from harming yourself.    Recognize the warning signs:  · Abrupt changes in personality, positive or negative- including increase in energy   · Giving away possessions  · Change in eating patterns- significant weight changes-  positive or negative  · Change in sleeping patterns- unable to sleep or sleeping all the time   · Unwillingness or inability to communicate  · Depression  · Unusual sadness, discouragement and loneliness  · Talk of wanting to die  · Neglect of personal appearance   · Rebelliousness- reckless behavior  · Withdrawal from people/activities they love  · Confusion- inability to concentrate     If you or a loved one observes any of these behaviors or has concerns about self-harm, here's what you can do:  · Talk about it- your feelings and reasons for harming yourself  · Remove any means that you might use to hurt yourself (examples: pills, rope, extension cords, firearm)  · Get professional help from the community (Mental Health, Substance Abuse, psychological counseling)  · Do not be alone:Call your Safe Contact- someone whom you trust who will  be there for you.  · Call your local CRISIS HOTLINE 121-3455 or 360-394-9170  · Call your local Children's Mobile Crisis Response Team [place] (568) 407-3709 or www.Effektif  · Call the toll free National Suicide Prevention Hotlines   · National Suicide Prevention Lifeline 617-586-EUFM (2781)  · National Hope Line Network 800-SUICIDE (622-1587)      Pulmonary Embolism    A pulmonary embolism (PE) is a sudden blockage or decrease of blood flow in one or both lungs. Most blockages come from a blood clot that forms in the vein of a lower leg, thigh, or arm (deep vein thrombosis, DVT) and travels to the lungs. A clot is blood that has thickened into a gel or solid. PE is a dangerous and life-threatening condition that needs to be treated right away.  What are the causes?  This condition is usually caused by a blood clot that forms in a vein and moves to the lungs. In rare cases, it may be caused by air, fat, part of a tumor, or other tissue that moves through the veins and into the lungs.  What increases the risk?  The following factors may make you more likely to develop this condition:  · Experiencing a traumatic injury, such as breaking a hip or leg.  · Having:  ? A spinal cord injury.  ? Orthopedic surgery, especially hip or knee replacement.  ? Any major surgery.  ? A stroke.  ? DVT.  ? Blood clots or blood clotting disease.  ? Long-term (chronic) lung or heart disease.  ? Cancer treated with chemotherapy.  ? A central venous catheter.  · Taking medicines that contain estrogen. These include birth control pills and hormone replacement therapy.  · Being:  ? Pregnant.  ? In the period of time after your baby is delivered (postpartum).  ? Older than age 60.  ? Overweight.  ? A smoker, especially if you have other risks.  What are the signs or symptoms?  Symptoms of this condition usually start suddenly and include:  · Shortness of breath during activity or at rest.  · Coughing, coughing up blood, or  coughing up blood-tinged mucus.  · Chest pain that is often worse with deep breaths.  · Rapid or irregular heartbeat.  · Feeling light-headed or dizzy.  · Fainting.  · Feeling anxious.  · Fever.  · Sweating.  · Pain and swelling in a leg. This is a symptom of DVT, which can lead to PE.  How is this diagnosed?  This condition may be diagnosed based on:  · Your medical history.  · A physical exam.  · Blood tests.  · CT pulmonary angiogram. This test checks blood flow in and around your lungs.  · Ventilation-perfusion scan, also called a lung VQ scan. This test measures air flow and blood flow to the lungs.  · An ultrasound of the legs.  How is this treated?  Treatment for this condition depends on many factors, such as the cause of your PE, your risk for bleeding or developing more clots, and other medical conditions you have. Treatment aims to remove, dissolve, or stop blood clots from forming or growing larger. Treatment may include:  · Medicines, such as:  ? Blood thinning medicines (anticoagulants) to stop clots from forming.  ? Medicines that dissolve clots (thrombolytics).  · Procedures, such as:  ? Using a flexible tube to remove a blood clot (embolectomy) or to deliver medicine to destroy it (catheter-directed thrombolysis).  ? Inserting a filter into a large vein that carries blood to the heart (inferior vena cava). This filter (vena cava filter) catches blood clots before they reach the lungs.  ? Surgery to remove the clot (surgical embolectomy). This is rare.  You may need a combination of immediate, long-term (up to 3 months after diagnosis), and extended (more than 3 months after diagnosis) treatments. Your treatment may continue for several months (maintenance therapy). You and your health care provider will work together to choose the treatment program that is best for you.  Follow these instructions at home:  Medicines  · Take over-the-counter and prescription medicines only as told by your health care  provider.  · If you are taking an anticoagulant medicine:  ? Take the medicine every day at the same time each day.  ? Understand what foods and drugs interact with your medicine.  ? Understand the side effects of this medicine, including excessive bruising or bleeding. Ask your health care provider or pharmacist about other side effects.  General instructions  · Wear a medical alert bracelet or carry a medical alert card that says you have had a PE and lists what medicines you take.  · Ask your health care provider when you may return to your normal activities. Avoid sitting or lying for a long time without moving.  · Maintain a healthy weight. Ask your health care provider what weight is healthy for you.  · Do not use any products that contain nicotine or tobacco, such as cigarettes, e-cigarettes, and chewing tobacco. If you need help quitting, ask your health care provider.  · Talk with your health care provider about any travel plans. It is important to make sure that you are still able to take your medicine while on trips.  · Keep all follow-up visits as told by your health care provider. This is important.  Contact a health care provider if:  · You missed a dose of your blood thinner medicine.  Get help right away if:  · You have:  ? New or increased pain, swelling, warmth, or redness in an arm or leg.  ? Numbness or tingling in an arm or leg.  ? Shortness of breath during activity or at rest.  ? A fever.  ? Chest pain.  ? A rapid or irregular heartbeat.  ? A severe headache.  ? Vision changes.  ? A serious fall or accident, or you hit your head.  ? Stomach (abdominal) pain.  ? Blood in your vomit, stool, or urine.  ? A cut that will not stop bleeding.  · You cough up blood.  · You feel light-headed or dizzy.  · You cannot move your arms or legs.  · You are confused or have memory loss.  These symptoms may represent a serious problem that is an emergency. Do not wait to see if the symptoms will go away. Get  medical help right away. Call your local emergency services (911 in the [place] Do not drive yourself to the hospital.  Summary  · A pulmonary embolism (PE) is a sudden blockage or decrease of blood flow in one or both lungs. PE is a dangerous and life-threatening condition that needs to be treated right away.  · Treatments for this condition usually include medicines to thin your blood (anticoagulants) or medicines to break apart blood clots (thrombolytics).  · If you are given blood thinners, it is important to take the medicine every day at the same time each day.  · Understand what foods and drugs interact with any medicines that you are taking.  · If you have signs of PE or DVT, call your local emergency services (911 in the [place]  This information is not intended to replace advice given to you by your health care provider. Make sure you discuss any questions you have with your health care provider.  Document Released: 12/15/2001 Document Revised: 09/25/2019 Document Reviewed: 09/25/2019  [employer] Patient Education © 2020 [employer]    Apixaban oral tablets  What is this medicine?  APIXABAN (a PIX a ban) is an anticoagulant (blood thinner). It is used to lower the chance of stroke in people with a medical condition called atrial fibrillation. It is also used to treat or prevent blood clots in the lungs or in the veins.  This medicine may be used for other purposes; ask your health care provider or pharmacist if you have questions.  COMMON BRAND NAME(S): Eliquis  What should I tell my health care provider before I take this medicine?  They need to know if you have any of these conditions:  · antiphospholipid antibody syndrome  · bleeding disorders  · bleeding in the brain  · blood in your stools (black or tarry stools) or if you have blood in your vomit  · history of blood clots  · history of stomach bleeding  · kidney disease  · liver disease  · mechanical heart valve  · an unusual or allergic reaction to  apixaban, other medicines, foods, dyes, or preservatives  · pregnant or trying to get pregnant  · breast-feeding  How should I use this medicine?  Take this medicine by mouth with a glass of water. Follow the directions on the prescription label. You can take it with or without food. If it upsets your stomach, take it with food. Take your medicine at regular intervals. Do not take it more often than directed. Do not stop taking except on your doctor's advice. Stopping this medicine may increase your risk of a blood clot. Be sure to refill your prescription before you run out of medicine.  Talk to your pediatrician regarding the use of this medicine in children. Special care may be needed.  Overdosage: If you think you have taken too much of this medicine contact a poison control center or emergency room at once.  NOTE: This medicine is only for you. Do not share this medicine with others.  What if I miss a dose?  If you miss a dose, take it as soon as you can. If it is almost time for your next dose, take only that dose. Do not take double or extra doses.  What may interact with this medicine?  This medicine may interact with the following:  · aspirin and aspirin-like medicines  · certain medicines for fungal infections like ketoconazole and itraconazole  · certain medicines for seizures like carbamazepine and phenytoin  · certain medicines that treat or prevent blood clots like warfarin, enoxaparin, and dalteparin  · clarithromycin  · NSAIDs, medicines for pain and inflammation, like ibuprofen or naproxen  · rifampin  · ritonavir  · Abhay's wort  This list may not describe all possible interactions. Give your health care provider a list of all the medicines, herbs, non-prescription drugs, or dietary supplements you use. Also tell them if you smoke, drink alcohol, or use illegal drugs. Some items may interact with your medicine.  What should I watch for while using this medicine?  Visit your healthcare  professional for regular checks on your progress. You may need blood work done while you are taking this medicine. Your condition will be monitored carefully while you are receiving this medicine. It is important not to miss any appointments.  Avoid sports and activities that might cause injury while you are using this medicine. Severe falls or injuries can cause unseen bleeding. Be careful when using sharp tools or knives. Consider using an electric razor. Take special care brushing or flossing your teeth. Report any injuries, bruising, or red spots on the skin to your healthcare professional.  If you are going to need surgery or other procedure, tell your healthcare professional that you are taking this medicine.  Wear a medical ID bracelet or chain. Carry a card that describes your disease and details of your medicine and dosage times.  What side effects may I notice from receiving this medicine?  Side effects that you should report to your doctor or health care professional as soon as possible:  · allergic reactions like skin rash, itching or hives, swelling of the face, lips, or tongue  · signs and symptoms of bleeding such as bloody or black, tarry stools; red or dark-brown urine; spitting up blood or brown material that looks like coffee grounds; red spots on the skin; unusual bruising or bleeding from the eye, gums, or nose  · signs and symptoms of a blood clot such as chest pain; shortness of breath; pain, swelling, or warmth in the leg  · signs and symptoms of a stroke such as changes in vision; confusion; trouble speaking or understanding; severe headaches; sudden numbness or weakness of the face, arm or leg; trouble walking; dizziness; loss of coordination  This list may not describe all possible side effects. Call your doctor for medical advice about side effects. You may report side effects to FDA at 8-915-FDA-9522.  Where should I keep my medicine?  Keep out of the reach of children.  Store at room  temperature between 20 and 25 degrees C (68 and 77 degrees F). Throw away any unused medicine after the expiration date.  NOTE: This sheet is a summary. It may not cover all possible information. If you have questions about this medicine, talk to your doctor, pharmacist, or health care provider.  © 2020 [employer] (2019-08-28 17:39:34)      Hand Washing  Germs such as bacteria, viruses, and parasites are found everywhere. They can be in the air and water. They can also be on surfaces like food, door handles, and your skin. Every day, your hands touch germs. Many of these germs can make you and your family sick. Washing your hands is one of the best ways to lower your risk of getting and sharing germs.  When should I wash my hands?  You should wash your hands whenever you think they are dirty. You should also wash your hands:  · Before:  ? Visiting a baby or anyone with a weakened disease-fighting system (immunesystem).  ? Putting in and taking out contact lenses.  · After:  ? Using the bathroom or helping someone else use the bathroom.  ? Working or playing outside.  ? Touching or taking out the garbage.  ? Touching anything dirty around your home.  ? Sneezing, coughing, or blowing your nose.  ? Using a phone, including your mobile phone.  ? Touching an animal, animal food, animal poop, or its toys or leash.  ? Touching money.  ? Using household  or poisonous chemicals.  ? Handling dirty clothes, bedding, or rags.  ? Using public transportation.  ? Going shopping, especially if you use a shopping cart or basket.  ? Shaking hands.  ? Handling livestock, such as cows or sheep.  · Before and after:  ? Preparing food.  ? Eating.  ? Visiting or taking care of someone who is sick. This includes touching used tissues, toys, and clothes.  ? Changing a bandage (dressing).  ? Taking care of an injury or wound.  ? Giving or taking medicine.  ? Preparing a bottle for a baby.  ? Feeding a baby or young  child.  ? Changing a diaper.  What is the right way to wash my hands?    2. Wet your hands with clean, running water. Turn off the water or move your hands out of the running water.  3. Apply liquid soap or bar soap to your hands.  4. Rub your hands together quickly to create lather.  5. Keep rubbing your hands together for at least 20 seconds. Thoroughly scrub all parts of your hands. This includes scrubbing under your fingernails and between your fingers.  6. Rinse your hands with clean, running water. Do this until all the soap is gone.  7. Dry your hands using an air dryer or a clean paper or cloth towel, or let your hands air-dry. Do not use your clothing or a dirty towel to dry your hands.  If you are in a public restroom, use your towel:  · To turn off the water faucet.  · To open the bathroom door.  How can I clean my hands if I do not have soap and water?    If soap and clean water are not available, use a hand-washing wipe, spray, or gel (hand ). Use one that contains at least 60% alcohol. If you are handling food, gels are not recommended as a replacement for hand washing with soap and water.  To use these products, follow the directions on the product, and:  · Apply enough product to cover your hands.  · Make sure you wipe, rub, or spray the product so that it reaches every part of your hands and wrists. Include the backs of your hands, between your fingers, and under your fingernails.  · Rub the product onto your hands until it dries.  Summary  · Every day, your hands touch germs. Many of these germs can make you and your family sick.  · Washing your hands is one of the best ways to lower your risk of getting and sharing germs.  · If soap and clean water are not available, use a hand-washing wipe, spray, or gel.  This information is not intended to replace advice given to you by your health care provider. Make sure you discuss any questions you have with your health care provider.  Document  Released: 11/30/2009 Document Revised: 09/26/2018 Document Reviewed: 09/26/2018  [employer]vier Patient Education © 2020 [employer].

## 2021-01-06 NOTE — PROGRESS NOTES
Received bedside report and accepted care of patient.     Pt is A/Ox4, room air with no visible or stated sign of distress, discomfort, or SOB. Pt currently does not complain of any pain at this time. Pt took medications without complications. Pt anticipated to go home.     Bed in locked and lowest position. Call light and personal possessions within reach. Patient educated about use of call light and verbalized understanding.

## 2021-01-06 NOTE — PROGRESS NOTES
Pt dc'd home. IV removed. Pt left unit via wheelchair with CNA and Daughter. Personal belongings with pt when leaving unit. Pt given discharge instructions prior to leaving unit including where to  prescriptions and when to follow-up; verbalizes understanding. Copy of discharge instructions with pt and in the chart.

## 2021-01-06 NOTE — PROGRESS NOTES
Alert x2-3, bed alarm in place, sleeping but easily arouses . Anticipate dc home with family and HH. Cont plan of care, call light within reach
